# Patient Record
Sex: FEMALE | Race: BLACK OR AFRICAN AMERICAN | Employment: OTHER | URBAN - METROPOLITAN AREA
[De-identification: names, ages, dates, MRNs, and addresses within clinical notes are randomized per-mention and may not be internally consistent; named-entity substitution may affect disease eponyms.]

---

## 2019-10-26 ENCOUNTER — APPOINTMENT (OUTPATIENT)
Dept: GENERAL RADIOLOGY | Age: 69
End: 2019-10-26
Payer: COMMERCIAL

## 2019-10-26 ENCOUNTER — HOSPITAL ENCOUNTER (EMERGENCY)
Age: 69
Discharge: HOME | End: 2019-10-26
Payer: COMMERCIAL

## 2019-10-26 VITALS
SYSTOLIC BLOOD PRESSURE: 126 MMHG | HEART RATE: 68 BPM | OXYGEN SATURATION: 100 % | RESPIRATION RATE: 18 BRPM | TEMPERATURE: 97.7 F | DIASTOLIC BLOOD PRESSURE: 63 MMHG

## 2019-10-26 DIAGNOSIS — M25.512 CHRONIC LEFT SHOULDER PAIN: Primary | ICD-10-CM

## 2019-10-26 DIAGNOSIS — G89.29 CHRONIC LEFT SHOULDER PAIN: Primary | ICD-10-CM

## 2019-10-26 DIAGNOSIS — M19.019 SHOULDER ARTHRITIS: ICD-10-CM

## 2019-10-26 PROCEDURE — 6370000000 HC RX 637 (ALT 250 FOR IP): Performed by: PHYSICIAN ASSISTANT

## 2019-10-26 PROCEDURE — 73030 X-RAY EXAM OF SHOULDER: CPT

## 2019-10-26 PROCEDURE — 99283 EMERGENCY DEPT VISIT LOW MDM: CPT

## 2019-10-26 PROCEDURE — 96372 THER/PROPH/DIAG INJ SC/IM: CPT

## 2019-10-26 PROCEDURE — 6360000002 HC RX W HCPCS: Performed by: PHYSICIAN ASSISTANT

## 2019-10-26 RX ORDER — ORPHENADRINE CITRATE 30 MG/ML
60 INJECTION INTRAMUSCULAR; INTRAVENOUS ONCE
Status: COMPLETED | OUTPATIENT
Start: 2019-10-26 | End: 2019-10-26

## 2019-10-26 RX ORDER — OXYCODONE HYDROCHLORIDE AND ACETAMINOPHEN 5; 325 MG/1; MG/1
1 TABLET ORAL ONCE
Status: COMPLETED | OUTPATIENT
Start: 2019-10-26 | End: 2019-10-26

## 2019-10-26 RX ADMIN — OXYCODONE HYDROCHLORIDE AND ACETAMINOPHEN 1 TABLET: 5; 325 TABLET ORAL at 02:36

## 2019-10-26 RX ADMIN — ORPHENADRINE CITRATE 60 MG: 30 INJECTION INTRAMUSCULAR; INTRAVENOUS at 02:36

## 2019-10-26 ASSESSMENT — PAIN DESCRIPTION - DIRECTION: RADIATING_TOWARDS: HAND

## 2019-10-26 ASSESSMENT — PAIN DESCRIPTION - ORIENTATION
ORIENTATION: LEFT
ORIENTATION: LEFT

## 2019-10-26 ASSESSMENT — PAIN DESCRIPTION - LOCATION
LOCATION: SHOULDER
LOCATION: SHOULDER

## 2019-10-26 ASSESSMENT — PAIN DESCRIPTION - DESCRIPTORS
DESCRIPTORS: SHARP
DESCRIPTORS: SHARP

## 2019-10-26 ASSESSMENT — PAIN DESCRIPTION - PAIN TYPE: TYPE: ACUTE PAIN

## 2019-10-26 ASSESSMENT — PAIN SCALES - GENERAL: PAINLEVEL_OUTOF10: 10

## 2019-10-26 ASSESSMENT — PAIN DESCRIPTION - PROGRESSION: CLINICAL_PROGRESSION: RAPIDLY WORSENING

## 2019-10-26 ASSESSMENT — PAIN DESCRIPTION - FREQUENCY: FREQUENCY: CONTINUOUS

## 2022-06-08 ENCOUNTER — HOSPITAL ENCOUNTER (OUTPATIENT)
Age: 72
Setting detail: OBSERVATION
Discharge: HOME | End: 2022-06-09
Attending: STUDENT IN AN ORGANIZED HEALTH CARE EDUCATION/TRAINING PROGRAM | Admitting: INTERNAL MEDICINE
Payer: MEDICARE

## 2022-06-08 ENCOUNTER — APPOINTMENT (OUTPATIENT)
Dept: GENERAL RADIOLOGY | Age: 72
End: 2022-06-08
Payer: MEDICARE

## 2022-06-08 ENCOUNTER — APPOINTMENT (OUTPATIENT)
Dept: CT IMAGING | Age: 72
End: 2022-06-08
Payer: MEDICARE

## 2022-06-08 DIAGNOSIS — M25.511 ACUTE PAIN OF RIGHT SHOULDER: ICD-10-CM

## 2022-06-08 DIAGNOSIS — R07.9 CHEST PAIN, UNSPECIFIED TYPE: Primary | ICD-10-CM

## 2022-06-08 PROBLEM — I10 PRIMARY HYPERTENSION: Status: ACTIVE | Noted: 2022-06-08

## 2022-06-08 PROBLEM — E11.9 TYPE 2 DIABETES MELLITUS, WITHOUT LONG-TERM CURRENT USE OF INSULIN (HCC): Status: ACTIVE | Noted: 2022-06-08

## 2022-06-08 PROBLEM — E78.5 HLD (HYPERLIPIDEMIA): Status: ACTIVE | Noted: 2022-06-08

## 2022-06-08 LAB
ALBUMIN SERPL-MCNC: 4.3 G/DL (ref 3.5–5.2)
ALP BLD-CCNC: 64 U/L (ref 35–104)
ALT SERPL-CCNC: 19 U/L (ref 0–32)
ANION GAP SERPL CALCULATED.3IONS-SCNC: 11 MMOL/L (ref 7–16)
AST SERPL-CCNC: 25 U/L (ref 0–31)
BACTERIA: NORMAL /HPF
BASOPHILS ABSOLUTE: 0.05 E9/L (ref 0–0.2)
BASOPHILS RELATIVE PERCENT: 1.5 % (ref 0–2)
BILIRUB SERPL-MCNC: 0.6 MG/DL (ref 0–1.2)
BILIRUBIN URINE: NEGATIVE
BLOOD, URINE: NEGATIVE
BUN BLDV-MCNC: 12 MG/DL (ref 6–23)
CALCIUM SERPL-MCNC: 9 MG/DL (ref 8.6–10.2)
CHLORIDE BLD-SCNC: 98 MMOL/L (ref 98–107)
CHOLESTEROL, TOTAL: 158 MG/DL (ref 0–199)
CLARITY: CLEAR
CO2: 28 MMOL/L (ref 22–29)
COLOR: YELLOW
CREAT SERPL-MCNC: 0.8 MG/DL (ref 0.5–1)
D DIMER: <200 NG/ML DDU
EKG ATRIAL RATE: 57 BPM
EKG P AXIS: 53 DEGREES
EKG P-R INTERVAL: 168 MS
EKG Q-T INTERVAL: 424 MS
EKG QRS DURATION: 88 MS
EKG QTC CALCULATION (BAZETT): 412 MS
EKG R AXIS: 45 DEGREES
EKG T AXIS: 57 DEGREES
EKG VENTRICULAR RATE: 57 BPM
EOSINOPHILS ABSOLUTE: 0.08 E9/L (ref 0.05–0.5)
EOSINOPHILS RELATIVE PERCENT: 2.4 % (ref 0–6)
GFR AFRICAN AMERICAN: >60
GFR NON-AFRICAN AMERICAN: >60 ML/MIN/1.73
GLUCOSE BLD-MCNC: 168 MG/DL (ref 74–99)
GLUCOSE URINE: NEGATIVE MG/DL
HBA1C MFR BLD: 8 % (ref 4–5.6)
HCT VFR BLD CALC: 36.3 % (ref 34–48)
HDLC SERPL-MCNC: 60 MG/DL
HEMOGLOBIN: 11.7 G/DL (ref 11.5–15.5)
IMMATURE GRANULOCYTES #: 0.01 E9/L
IMMATURE GRANULOCYTES %: 0.3 % (ref 0–5)
KETONES, URINE: NEGATIVE MG/DL
LDL CHOLESTEROL CALCULATED: 86 MG/DL (ref 0–99)
LEUKOCYTE ESTERASE, URINE: NEGATIVE
LYMPHOCYTES ABSOLUTE: 1.27 E9/L (ref 1.5–4)
LYMPHOCYTES RELATIVE PERCENT: 37.4 % (ref 20–42)
MCH RBC QN AUTO: 27.2 PG (ref 26–35)
MCHC RBC AUTO-ENTMCNC: 32.2 % (ref 32–34.5)
MCV RBC AUTO: 84.4 FL (ref 80–99.9)
METER GLUCOSE: 158 MG/DL (ref 74–99)
METER GLUCOSE: 388 MG/DL (ref 74–99)
MONOCYTES ABSOLUTE: 0.42 E9/L (ref 0.1–0.95)
MONOCYTES RELATIVE PERCENT: 12.4 % (ref 2–12)
NEUTROPHILS ABSOLUTE: 1.57 E9/L (ref 1.8–7.3)
NEUTROPHILS RELATIVE PERCENT: 46 % (ref 43–80)
NITRITE, URINE: NEGATIVE
PDW BLD-RTO: 11.9 FL (ref 11.5–15)
PH UA: 6 (ref 5–9)
PLATELET # BLD: 223 E9/L (ref 130–450)
PMV BLD AUTO: 11 FL (ref 7–12)
POTASSIUM REFLEX MAGNESIUM: 3.6 MMOL/L (ref 3.5–5)
PRO-BNP: 96 PG/ML (ref 0–125)
PROTEIN UA: NEGATIVE MG/DL
RBC # BLD: 4.3 E12/L (ref 3.5–5.5)
RBC UA: NORMAL /HPF (ref 0–2)
SODIUM BLD-SCNC: 137 MMOL/L (ref 132–146)
SPECIFIC GRAVITY UA: <=1.005 (ref 1–1.03)
TOTAL PROTEIN: 6.8 G/DL (ref 6.4–8.3)
TRIGL SERPL-MCNC: 58 MG/DL (ref 0–149)
TROPONIN, HIGH SENSITIVITY: <6 NG/L (ref 0–9)
TROPONIN, HIGH SENSITIVITY: <6 NG/L (ref 0–9)
UROBILINOGEN, URINE: 0.2 E.U./DL
VLDLC SERPL CALC-MCNC: 12 MG/DL
WBC # BLD: 3.4 E9/L (ref 4.5–11.5)
WBC UA: NORMAL /HPF (ref 0–5)

## 2022-06-08 PROCEDURE — 6360000004 HC RX CONTRAST MEDICATION: Performed by: RADIOLOGY

## 2022-06-08 PROCEDURE — 83880 ASSAY OF NATRIURETIC PEPTIDE: CPT

## 2022-06-08 PROCEDURE — 6360000002 HC RX W HCPCS: Performed by: INTERNAL MEDICINE

## 2022-06-08 PROCEDURE — 71045 X-RAY EXAM CHEST 1 VIEW: CPT

## 2022-06-08 PROCEDURE — 85378 FIBRIN DEGRADE SEMIQUANT: CPT

## 2022-06-08 PROCEDURE — 96372 THER/PROPH/DIAG INJ SC/IM: CPT

## 2022-06-08 PROCEDURE — 6370000000 HC RX 637 (ALT 250 FOR IP): Performed by: STUDENT IN AN ORGANIZED HEALTH CARE EDUCATION/TRAINING PROGRAM

## 2022-06-08 PROCEDURE — 36415 COLL VENOUS BLD VENIPUNCTURE: CPT

## 2022-06-08 PROCEDURE — 2580000003 HC RX 258: Performed by: RADIOLOGY

## 2022-06-08 PROCEDURE — 80053 COMPREHEN METABOLIC PANEL: CPT

## 2022-06-08 PROCEDURE — 93005 ELECTROCARDIOGRAM TRACING: CPT | Performed by: STUDENT IN AN ORGANIZED HEALTH CARE EDUCATION/TRAINING PROGRAM

## 2022-06-08 PROCEDURE — 81001 URINALYSIS AUTO W/SCOPE: CPT

## 2022-06-08 PROCEDURE — 82962 GLUCOSE BLOOD TEST: CPT

## 2022-06-08 PROCEDURE — 80061 LIPID PANEL: CPT

## 2022-06-08 PROCEDURE — 2580000003 HC RX 258: Performed by: INTERNAL MEDICINE

## 2022-06-08 PROCEDURE — 6370000000 HC RX 637 (ALT 250 FOR IP): Performed by: INTERNAL MEDICINE

## 2022-06-08 PROCEDURE — 99220 PR INITIAL OBSERVATION CARE/DAY 70 MINUTES: CPT | Performed by: INTERNAL MEDICINE

## 2022-06-08 PROCEDURE — 6360000002 HC RX W HCPCS: Performed by: STUDENT IN AN ORGANIZED HEALTH CARE EDUCATION/TRAINING PROGRAM

## 2022-06-08 PROCEDURE — 6370000000 HC RX 637 (ALT 250 FOR IP): Performed by: NURSE PRACTITIONER

## 2022-06-08 PROCEDURE — 85025 COMPLETE CBC W/AUTO DIFF WBC: CPT

## 2022-06-08 PROCEDURE — 99285 EMERGENCY DEPT VISIT HI MDM: CPT

## 2022-06-08 PROCEDURE — 71275 CT ANGIOGRAPHY CHEST: CPT

## 2022-06-08 PROCEDURE — G0378 HOSPITAL OBSERVATION PER HR: HCPCS

## 2022-06-08 PROCEDURE — 84484 ASSAY OF TROPONIN QUANT: CPT

## 2022-06-08 PROCEDURE — 96374 THER/PROPH/DIAG INJ IV PUSH: CPT

## 2022-06-08 PROCEDURE — 83036 HEMOGLOBIN GLYCOSYLATED A1C: CPT

## 2022-06-08 PROCEDURE — APPSS45 APP SPLIT SHARED TIME 31-45 MINUTES: Performed by: NURSE PRACTITIONER

## 2022-06-08 RX ORDER — ACETAMINOPHEN 325 MG/1
650 TABLET ORAL EVERY 6 HOURS PRN
Status: DISCONTINUED | OUTPATIENT
Start: 2022-06-08 | End: 2022-06-09 | Stop reason: HOSPADM

## 2022-06-08 RX ORDER — INSULIN LISPRO 100 [IU]/ML
0-12 INJECTION, SOLUTION INTRAVENOUS; SUBCUTANEOUS
Status: DISCONTINUED | OUTPATIENT
Start: 2022-06-09 | End: 2022-06-09 | Stop reason: HOSPADM

## 2022-06-08 RX ORDER — GLIPIZIDE 5 MG/1
10 TABLET ORAL
Status: DISCONTINUED | OUTPATIENT
Start: 2022-06-09 | End: 2022-06-09 | Stop reason: HOSPADM

## 2022-06-08 RX ORDER — DEXTROSE MONOHYDRATE 50 MG/ML
100 INJECTION, SOLUTION INTRAVENOUS PRN
Status: DISCONTINUED | OUTPATIENT
Start: 2022-06-08 | End: 2022-06-09 | Stop reason: HOSPADM

## 2022-06-08 RX ORDER — SODIUM CHLORIDE 0.9 % (FLUSH) 0.9 %
10 SYRINGE (ML) INJECTION PRN
Status: DISCONTINUED | OUTPATIENT
Start: 2022-06-08 | End: 2022-06-08

## 2022-06-08 RX ORDER — ONDANSETRON 4 MG/1
4 TABLET, ORALLY DISINTEGRATING ORAL EVERY 8 HOURS PRN
Status: DISCONTINUED | OUTPATIENT
Start: 2022-06-08 | End: 2022-06-09 | Stop reason: HOSPADM

## 2022-06-08 RX ORDER — ENOXAPARIN SODIUM 100 MG/ML
40 INJECTION SUBCUTANEOUS DAILY
Status: DISCONTINUED | OUTPATIENT
Start: 2022-06-08 | End: 2022-06-09 | Stop reason: HOSPADM

## 2022-06-08 RX ORDER — SODIUM CHLORIDE 9 MG/ML
INJECTION, SOLUTION INTRAVENOUS PRN
Status: DISCONTINUED | OUTPATIENT
Start: 2022-06-08 | End: 2022-06-09 | Stop reason: HOSPADM

## 2022-06-08 RX ORDER — DIPHENHYDRAMINE HCL 25 MG
50 TABLET ORAL ONCE
Status: COMPLETED | OUTPATIENT
Start: 2022-06-08 | End: 2022-06-08

## 2022-06-08 RX ORDER — INSULIN LISPRO 100 [IU]/ML
0-6 INJECTION, SOLUTION INTRAVENOUS; SUBCUTANEOUS NIGHTLY
Status: DISCONTINUED | OUTPATIENT
Start: 2022-06-08 | End: 2022-06-09 | Stop reason: HOSPADM

## 2022-06-08 RX ORDER — INSULIN LISPRO 100 [IU]/ML
0-3 INJECTION, SOLUTION INTRAVENOUS; SUBCUTANEOUS NIGHTLY
Status: DISCONTINUED | OUTPATIENT
Start: 2022-06-08 | End: 2022-06-08

## 2022-06-08 RX ORDER — ONDANSETRON 2 MG/ML
4 INJECTION INTRAMUSCULAR; INTRAVENOUS EVERY 6 HOURS PRN
Status: DISCONTINUED | OUTPATIENT
Start: 2022-06-08 | End: 2022-06-09 | Stop reason: HOSPADM

## 2022-06-08 RX ORDER — HYDROCHLOROTHIAZIDE 12.5 MG/1
6.25 TABLET ORAL DAILY
Status: DISCONTINUED | OUTPATIENT
Start: 2022-06-08 | End: 2022-06-09 | Stop reason: HOSPADM

## 2022-06-08 RX ORDER — METHYLPREDNISOLONE SODIUM SUCCINATE 125 MG/2ML
125 INJECTION, POWDER, LYOPHILIZED, FOR SOLUTION INTRAMUSCULAR; INTRAVENOUS DAILY
Status: DISCONTINUED | OUTPATIENT
Start: 2022-06-08 | End: 2022-06-08

## 2022-06-08 RX ORDER — SODIUM CHLORIDE 0.9 % (FLUSH) 0.9 %
5-40 SYRINGE (ML) INJECTION EVERY 12 HOURS SCHEDULED
Status: DISCONTINUED | OUTPATIENT
Start: 2022-06-08 | End: 2022-06-09 | Stop reason: HOSPADM

## 2022-06-08 RX ORDER — SODIUM CHLORIDE 0.9 % (FLUSH) 0.9 %
5-40 SYRINGE (ML) INJECTION PRN
Status: DISCONTINUED | OUTPATIENT
Start: 2022-06-08 | End: 2022-06-09 | Stop reason: HOSPADM

## 2022-06-08 RX ORDER — ACETAMINOPHEN 650 MG/1
650 SUPPOSITORY RECTAL EVERY 6 HOURS PRN
Status: DISCONTINUED | OUTPATIENT
Start: 2022-06-08 | End: 2022-06-09 | Stop reason: HOSPADM

## 2022-06-08 RX ORDER — INSULIN LISPRO 100 [IU]/ML
0-6 INJECTION, SOLUTION INTRAVENOUS; SUBCUTANEOUS
Status: DISCONTINUED | OUTPATIENT
Start: 2022-06-08 | End: 2022-06-08

## 2022-06-08 RX ORDER — BISOPROLOL FUMARATE AND HYDROCHLOROTHIAZIDE 2.5; 6.25 MG/1; MG/1
1 TABLET ORAL DAILY
Status: DISCONTINUED | OUTPATIENT
Start: 2022-06-08 | End: 2022-06-08 | Stop reason: RX

## 2022-06-08 RX ORDER — ROSUVASTATIN CALCIUM 10 MG/1
10 TABLET, COATED ORAL DAILY
Status: DISCONTINUED | OUTPATIENT
Start: 2022-06-08 | End: 2022-06-09 | Stop reason: HOSPADM

## 2022-06-08 RX ORDER — METOPROLOL SUCCINATE 25 MG/1
25 TABLET, EXTENDED RELEASE ORAL DAILY
Status: DISCONTINUED | OUTPATIENT
Start: 2022-06-08 | End: 2022-06-09 | Stop reason: HOSPADM

## 2022-06-08 RX ORDER — POLYETHYLENE GLYCOL 3350 17 G/17G
17 POWDER, FOR SOLUTION ORAL DAILY PRN
Status: DISCONTINUED | OUTPATIENT
Start: 2022-06-08 | End: 2022-06-09 | Stop reason: HOSPADM

## 2022-06-08 RX ADMIN — INSULIN LISPRO 5 UNITS: 100 INJECTION, SOLUTION INTRAVENOUS; SUBCUTANEOUS at 21:32

## 2022-06-08 RX ADMIN — METOPROLOL SUCCINATE 25 MG: 25 TABLET, EXTENDED RELEASE ORAL at 17:43

## 2022-06-08 RX ADMIN — HYDROCHLOROTHIAZIDE 6.25 MG: 12.5 TABLET ORAL at 17:44

## 2022-06-08 RX ADMIN — ROSUVASTATIN CALCIUM 10 MG: 10 TABLET, FILM COATED ORAL at 17:43

## 2022-06-08 RX ADMIN — ASPIRIN 325 MG: 325 TABLET, COATED ORAL at 14:09

## 2022-06-08 RX ADMIN — SODIUM CHLORIDE, PRESERVATIVE FREE 10 ML: 5 INJECTION INTRAVENOUS at 21:29

## 2022-06-08 RX ADMIN — INSULIN LISPRO 1 UNITS: 100 INJECTION, SOLUTION INTRAVENOUS; SUBCUTANEOUS at 17:47

## 2022-06-08 RX ADMIN — ENOXAPARIN SODIUM 40 MG: 40 INJECTION SUBCUTANEOUS at 17:44

## 2022-06-08 RX ADMIN — METHYLPREDNISOLONE SODIUM SUCCINATE 125 MG: 125 INJECTION, POWDER, FOR SOLUTION INTRAMUSCULAR; INTRAVENOUS at 14:11

## 2022-06-08 RX ADMIN — IOPAMIDOL 75 ML: 755 INJECTION, SOLUTION INTRAVENOUS at 14:47

## 2022-06-08 RX ADMIN — DIPHENHYDRAMINE HCL 50 MG: 25 TABLET ORAL at 14:09

## 2022-06-08 RX ADMIN — SODIUM CHLORIDE, PRESERVATIVE FREE 10 ML: 5 INJECTION INTRAVENOUS at 14:49

## 2022-06-08 ASSESSMENT — PAIN SCALES - GENERAL
PAINLEVEL_OUTOF10: 2
PAINLEVEL_OUTOF10: 0

## 2022-06-08 ASSESSMENT — ENCOUNTER SYMPTOMS
ABDOMINAL PAIN: 0
BACK PAIN: 1
CHEST TIGHTNESS: 0
DIARRHEA: 0
COUGH: 0
NAUSEA: 0
SORE THROAT: 0
VOMITING: 0
SHORTNESS OF BREATH: 0
ABDOMINAL DISTENTION: 0

## 2022-06-08 ASSESSMENT — PAIN DESCRIPTION - ORIENTATION: ORIENTATION: RIGHT

## 2022-06-08 ASSESSMENT — PAIN DESCRIPTION - DESCRIPTORS: DESCRIPTORS: OTHER (COMMENT);TENDER

## 2022-06-08 ASSESSMENT — PAIN DESCRIPTION - LOCATION: LOCATION: SHOULDER

## 2022-06-08 NOTE — ED NOTES
The following labs labeled with pt sticker and tubed to lab:     [x] Blue     [] Lavender   [] on ice  [x] Green/yellow  [] Green/black [] on ice  [] Yellow  [] Red  [] Pink      [] COVID-19 swab    [] Rapid  [] PCR  [] Flu swab  [] Peds Viral Panel     [] Urine Sample  [] Pelvic Cultures  [] Blood Cultures          Johanne Chiu RN  06/08/22 3469

## 2022-06-08 NOTE — PROGRESS NOTES
Admission database completed to best of this RN's ability. Care plan and education initiated. Pt from home with . Denies any DME or [facility] services prior to admission.

## 2022-06-08 NOTE — H&P
[facility]   History and Physical      CHIEF COMPLAINT:   Chest pain    History of Present Illness:  80 y.o. female with a past medical  history of DM2, HTN, HLD who presents to the ER with sudden onset right sided chest pain that radiates to her right shoulder. She describes the pain as a squeezing, tight pain that is worse with movement and is reproducible. Denies aggravating or alleviating factors. No associated symptoms. Informant(s) for H&P: patient     REVIEW OF SYSTEMS:  Denies SOB subjective fever/chills, cough, congestion, n/v/d, ha, vision/hearing changes, wt changes, hot/cold flashes, other open skin lesions, constipation, dysuria/hematuria unless noted in HPI. Complete ROS performed with the patient and is otherwise negative. PMH:  Past Medical History:   Diagnosis Date    Diabetes mellitus ([facility] [address]     Hyperlipidemia     Hypertension        Surgical History:  Past Surgical History:   Procedure Laterality Date    HYSTERECTOMY         Medications Prior to Admission:    Prior to Admission medications    Medication Sig Start Date End Date Taking? Authorizing Provider   glipiZIDE (GLUCOTROL XL) 10 MG extended release tablet Take 10 mg by mouth daily    Historical Provider, MD   loratadine (CLARITIN) 10 MG tablet Take 1 tablet by mouth daily 8/22/17   Tyree Dakin Swift,    bisoprolol-hydrochlorothiazide [facility]) 2.5-6.25 MG per tablet Take 1 tablet by mouth daily. Historical Provider, MD   potassium chloride (MICRO-K) 10 MEQ CR capsule Take 10 mEq by mouth 2 times daily. Historical Provider, MD   meclizine (ANTIVERT) 25 MG CHEW Take 25 mg by mouth 3 times daily as needed. Historical Provider, MD   cyclobenzaprine (FLEXERIL) 10 MG tablet Take 10 mg by mouth 3 times daily as needed for Muscle spasms. Historical Provider, MD   rosuvastatin (CRESTOR) 10 MG tablet Take 10 mg by mouth daily.     Historical Provider, MD   metFORMIN (GLUCOPHAGE) 500 MG tablet Take 1,000 mg by mouth 2 times daily (with meals)     Historical Provider, MD       Allergies: Iv dye [iodides]    Social History:    reports that she has never smoked. She has never used smokeless tobacco. She reports that she does not drink alcohol and does not use drugs. Family History:   family history is not on file. PHYSICAL EXAM:  Vitals:  BP (!) 163/78   Pulse 60   Temp (!) 96.5 °F (35.8 °C) (Temporal)   Resp 16   Ht 5' 6\" (1.676 m)   Wt 152 lb (68.9 kg)   SpO2 99%   BMI 24.53 kg/m²     General Appearance: alert and oriented to person, place and time and in no acute distress  Skin: warm and dry  Head: normocephalic and atraumatic  Eyes: pupils equal, round, and reactive to light, extraocular eye movements intact, conjunctivae normal  Neck: neck supple and non tender without mass   Pulmonary/Chest: clear to auscultation bilaterally- no wheezes, rales or rhonchi, normal air movement, no respiratory distress  Cardiovascular: normal rate, normal S1 and S2 and no carotid bruits  Abdomen: soft, non-tender, non-distended, normal bowel sounds, no masses or organomegaly  Extremities: no cyanosis, no clubbing and no edema  Neurologic: no cranial nerve deficit and speech normal    LABS:  Recent Labs     06/08/22  1324      K 3.6   CL 98   CO2 28   BUN 12   CREATININE 0.8   GLUCOSE 168*   CALCIUM 9.0       Recent Labs     06/08/22  1324   WBC 3.4*   RBC 4.30   HGB 11.7   HCT 36.3   MCV 84.4   MCH 27.2   MCHC 32.2   RDW 11.9      MPV 11.0       No results for input(s): POCGLU in the last 72 hours. I reviewed all labs and diagnostic images        Radiology: XR CHEST PORTABLE    Result Date: 6/8/2022  EXAMINATION: ONE XRAY VIEW OF THE CHEST 6/8/2022 1:42 pm COMPARISON: None. HISTORY: ORDERING SYSTEM PROVIDED HISTORY: chest pain TECHNOLOGIST PROVIDED HISTORY: Reason for exam:->chest pain FINDINGS: Single AP erect portable chest demonstrate satisfactory expansion lungs which are clear. There are atherosclerotic changes of the aorta without cardiomegaly. There is no evidence of a pneumothorax. No acute cardiopulmonary process. See the CT chest follow-up. CTA CHEST W CONTRAST    Result Date: 6/8/2022  EXAMINATION: CTA OF THE CHEST 6/8/2022 2:46 pm TECHNIQUE: CTA of the chest was performed after the administration of intravenous contrast.  Multiplanar reformatted images are provided for review. MIP images are provided for review. Automated exposure control, iterative reconstruction, and/or weight based adjustment of the mA/kV was utilized to reduce the radiation dose to as low as reasonably achievable. COMPARISON: None. HISTORY: ORDERING SYSTEM PROVIDED HISTORY: chest pain into the back TECHNOLOGIST PROVIDED HISTORY: Reason for exam:->chest pain into the back Decision Support Exception - unselect if not a suspected or confirmed emergency medical condition->Emergency Medical Condition (MA) FINDINGS: Pulmonary Arteries: Pulmonary arteries are adequately opacified for evaluation. No evidence of intraluminal filling defect to suggest pulmonary embolism. Main pulmonary artery is normal in caliber. Mediastinum: No evidence of mediastinal lymphadenopathy. The heart and pericardium demonstrate no acute abnormality. There is no acute abnormality of the thoracic aorta. Lungs/pleura: The lungs are without acute process. No focal consolidation or pulmonary edema. No evidence of pleural effusion or pneumothorax. Upper Abdomen: Limited images of the upper abdomen reveals stones identified in the gallbladder. Soft Tissues/Bones: No acute bone or soft tissue abnormality. No evidence of pulmonary embolism or acute pulmonary abnormality. EKG:       ASSESSMENT:      Principal Problem:    Chest pain  Active Problems:    Type 2 diabetes mellitus, without long-term current use of insulin (HCC)    Primary hypertension    HLD (hyperlipidemia)  Resolved Problems:    * No resolved hospital problems. *      PLAN:    1. Chest pain  -normal troponin  -HEART score 4  -given  mg in ER  -NPO after midnight for stress in am  -add ASA  -for Echo    2. DM2:  -continue home glipizide, hold  Metformin 48hrs due  To CT with ontrast  -add carb control diet, low dose insulin slide sale, hypoglycemia protocol  - update A1c    3. HTN:  -resume home bisopropol-HCTZ    4. HLD:  -resume home statin         Code Status:  full  DVT prophylaxis: lovenox         Electronically signed by CHRIS De La Torre - CNP on 6/8/2022 at 3:59 PM    Addendum: I have personally participated in the history, exam, medical decision making with Annika Brady on the date of service and I agree with all of the pertinent clinical information unless otherwise noted. I have also reviewed and agree with the past medical, family, and social history unless otherwise noted. Patient was admitted with chest pain. PHYSICAL EXAM:  Vitals:  BP (!) 167/72   Pulse 64   Temp 98.3 °F (36.8 °C) (Oral)   Resp 16   Ht 5' 6\" (1.676 m)   Wt 152 lb (68.9 kg)   SpO2 97%   BMI 24.53 kg/m²   Gen: awake, alert, NAD  Lungs: clear to auscultation bilaterally no crackles no wheezing. Heart: RRR, no murmur   Abdomen: soft nontender nondistended positive bowel sounds. Extremities: full range of motion no peripheral edema. Impression:  Principal Problem:    Chest pain  Active Problems:    Type 2 diabetes mellitus, without long-term current use of insulin (HCC)    Primary hypertension    HLD (hyperlipidemia)  Resolved Problems:    * No resolved hospital problems. *      My findings/plan include:      10year-old female presents with history of diabetes mellitus type 2, hypertension tension, hyperlipidemia who presents with right-sided chest pain that began yesterday. Patient states that she has not been doing any exertion particular when it first started. Patient states that the chest pain on the right side goes to the back.   She denies any nausea, vomiting, shortness of breath, diaphoresis associated with it. Patient states that it is intermittent. At its worst is 8 out of 10. Patient states that she has not taken any medications to improve in particular no nitro. Patient states she does not have a cardiologist.  She does not remember the last time she had a cardiac evaluation. Troponin high-sensitivity's x2 negative. EKG unremarkable for any acute ischemic changes. We will check hemoglobin A1c as well as LDL. Follow-up with stress test tomorrow. At this point ACS has been ruled out. However we do need to minimize risk and risk stratify her. NOTE: This report was transcribed using voice recognition software. Every effort was made to ensure accuracy; however, inadvertent computerized transcription errors may be present.   Electronically signed by Masha Radford DO on 6/8/2022 at 4:55 PM

## 2022-06-08 NOTE — PLAN OF CARE
Problem: Pain  Goal: Verbalizes/displays adequate comfort level or baseline comfort level  6/8/2022 1856 by Velma Mcclelland RN  Outcome: Progressing  6/8/2022 1855 by Velma Mcclelland RN  Outcome: Progressing     Problem: Safety - Adult  Goal: Free from fall injury  Outcome: Progressing

## 2022-06-08 NOTE — ED NOTES
Radiology Procedure Waiver   Name: Stephanie Chen  : 1950  MRN: [id number]    Date:  22    Time: 1:22 PM EDT    Benefits of immediately proceeding with Radiology exam(s) without pre-testing outweigh the risks or are not indicated as specified below and therefore the following is/are being waived:    [] Pregnancy test   [] Patients LMP on-time and regular.   [] Patient had Tubal Ligation or has other Contraception Device. [] Patient  is Menopausal or Premenarcheal.    [] Patient had Full or Partial Hysterectomy. [x] Protocol for Iodine allergy    [] MRI Questionnaire     [] BUN/Creatinine   [] Patient age w/no hx of renal dysfunction. [] Patient on Dialysis. [] Recent Normal Labs.   Electronically signed by Tru Crawford MD on 22 at 1:22 PM EDT               Tru Crawford MD  Resident  22 1419

## 2022-06-08 NOTE — ED PROVIDER NOTES
HPI     Patient is a 80 y.o. female presents with a chief complaint of chest pain  This has been occurring for one day. Patient states that it gets better with rest.  Patient states that it gets worse with nothing. Patient states that it is moderate in severity. Patient states it was gradual in onset. Patient presents with chest pain. Patient states it goes through into the right shoulder. Patient states it is worse with activity. Patient denies any changes in urinary or bowel habits. Patient states that she has a history of high blood pressure, high cholesterol, diabetes. Patient states she has never had pain like this before. Patient denies any changes in urinary or bowel habits. Patient has pain into the back right shoulder. Review of Systems   Constitutional: Negative for activity change, appetite change, chills, fatigue and fever. HENT: Negative for congestion, drooling and sore throat. Respiratory: Negative for cough, chest tightness and shortness of breath. Cardiovascular: Positive for chest pain. Negative for palpitations. Gastrointestinal: Negative for abdominal distention, abdominal pain, diarrhea, nausea and vomiting. Genitourinary: Negative for decreased urine volume, difficulty urinating, enuresis, flank pain, frequency and hematuria. Musculoskeletal: Positive for back pain. Negative for arthralgias and neck stiffness. Skin: Negative for rash and wound. Neurological: Negative for dizziness, facial asymmetry, light-headedness and headaches. Psychiatric/Behavioral: Negative for agitation, confusion and decreased concentration. Physical Exam  Vitals and nursing note reviewed. Constitutional:       Appearance: She is well-developed. HENT:      Head: Normocephalic and atraumatic. Eyes:      Conjunctiva/sclera: Conjunctivae normal.   Cardiovascular:      Rate and Rhythm: Normal rate and regular rhythm. Heart sounds: Normal heart sounds.  No murmur heard.      Pulmonary:      Effort: Pulmonary effort is normal. No respiratory distress. Breath sounds: Normal breath sounds. No wheezing or rales. Abdominal:      General: Bowel sounds are normal.      Palpations: Abdomen is soft. Tenderness: There is no abdominal tenderness. There is no guarding or rebound. Musculoskeletal:      Cervical back: Normal range of motion and neck supple. Comments: Reproducible back pain over the right shoulder. Skin:     General: Skin is warm and dry. Neurological:      Mental Status: She is alert and oriented to person, place, and time. Cranial Nerves: No cranial nerve deficit. Coordination: Coordination normal.          Procedures     MDM   The HEART Risk Score for Acute Coronary Syndrome  Age <46 years, 55-63, 65+  ?  2   > 2 Risk Factors for CAD?*, 1-2, 0  2   History: slight, moderate, highly suspicious  1   EKG: Normal, nonspecific repolarization changes, ST depression   1       Total HEART Score: 6     *Risk factors as follows:                  - Family history of CAD    - Hypertension      - Hyperlipidemia     - Diabetes mellitus  - Current smoker  - Obesity    Predicts 6-week risk of major adverse cardiac event. Low Score (0-3 points), risk of MACE of 0.9-1.7%. Moderate Score (4-6 points), risk of MACE of 12-16.6%. High Score (7-10 points), risk of MACE of 50-65%. ED Course as of 06/08/22 1955 Wed Jun 08, 2022   1529 EKG read interpreted by me. Rate 57 bpm.  Normal axis. Normal [place] interval.  Normal QRS. Flattening of the T waves in the anterior leads. No ST elevations or depressions. No prior EKG to compare to. [JM]      ED Course User Index  [JM] Frederic Ballesteros MD      Patient is a 80 y.o. female presenting with chest pain and back pain. Patient's initial blood pressure was 303 systolic. Patient has a previous history did die but states that it had no respiratory symptoms. Patient was pretreated.   Patient was then given a CTA to rule out aortic dissection. Patient CTA was negative. Patient had an EKG that was benign. Patient's initial troponin was benign. Given patient's elevated heart score and no history of stress test patient will be admitted to internal medicine for cardiac observation. Discussed this with patient is agreeable to this plan. Discussed this case with internal medicine is agreeable to this plan. Patient was seen and evaluated by myself and my attending Adilson Fajardo DO. Assessment and Plan discussed with attending provider, please see attestation for final plan of care. This note was done using dictation software and there may be some grammatical errors associated with this. Torie Rodrigues MD       ED Course as of 06/08/22 2004 Wed Jun 08, 2022   2754 EKG read interpreted by me. Rate 57 bpm.  Normal axis. Normal [place] interval.  Normal QRS. Flattening of the T waves in the anterior leads. No ST elevations or depressions. No prior EKG to compare to. [JM]      ED Course User Index  [] Torie Rodrigues MD       --------------------------------------------- PAST HISTORY ---------------------------------------------  Past Medical History:  has a past medical history of Diabetes mellitus ([address] Hyperlipidemia, and Hypertension. Past Surgical History:  has a past surgical history that includes Hysterectomy. Social History:  reports that she has never smoked. She has never used smokeless tobacco. She reports that she does not drink alcohol and does not use drugs. Family History: family history is not on file. The patients home medications have been reviewed. Allergies:  Iv dye [iodides]    -------------------------------------------------- RESULTS -------------------------------------------------    LABS:  Results for orders placed or performed during the hospital encounter of 06/08/22   CBC with Auto Differential   Result Value Ref Range    WBC 3.4 (L) 4.5 - 11.5 E9/L    RBC Nitrite, Urine Negative Negative    Leukocyte Esterase, Urine Negative Negative    WBC, UA NONE 0 - 5 /HPF    RBC, UA NONE 0 - 2 /HPF    Bacteria, UA NONE SEEN None Seen /HPF   D-Dimer, Quantitative   Result Value Ref Range    D-Dimer, Quant <200 ng/mL DDU   Troponin   Result Value Ref Range    Troponin, High Sensitivity <6 0 - 9 ng/L   Lipid Panel   Result Value Ref Range    Cholesterol, Total 158 0 - 199 mg/dL    Triglycerides 58 0 - 149 mg/dL    HDL 60 >40 mg/dL    LDL Calculated 86 0 - 99 mg/dL    VLDL Cholesterol Calculated 12 mg/dL   POCT Glucose   Result Value Ref Range    Meter Glucose 158 (H) 74 - 99 mg/dL   EKG 12 Lead   Result Value Ref Range    Ventricular Rate 57 BPM    Atrial Rate 57 BPM    P-R Interval 168 ms    QRS Duration 88 ms    Q-T Interval 424 ms    QTc Calculation (Bazett) 412 ms    P Axis 53 degrees    R Axis 45 degrees    T Axis 57 degrees       RADIOLOGY:  CTA CHEST W CONTRAST   Final Result   No evidence of pulmonary embolism or acute pulmonary abnormality. XR CHEST PORTABLE   Final Result   No acute cardiopulmonary process. See the CT chest follow-up. NM Cardiac Stress Test Nuclear Imaging    (Results Pending)           ------------------------- NURSING NOTES AND VITALS REVIEWED ---------------------------  Date / Time Roomed:  6/8/2022 12:47 PM  ED Bed Assignment:  [id number]    The nursing notes within the ED encounter and vital signs as below have been reviewed.      Patient Vitals for the past 24 hrs:   BP Temp Temp src Pulse Resp SpO2 Height Weight   06/08/22 1645 (!) 167/72 98.3 °F (36.8 °C) Oral 64 16 97 %     06/08/22 1545 (!) 170/70 97.8 °F (36.6 °C) Oral 60 16 98 %     06/08/22 1400 (!) 163/78   60 16 99 %     06/08/22 1246 (!) 194/83 (!) 96.5 °F (35.8 °C) Temporal 69 14 99 % 5' 6\" (1.676 m) 152 lb (68.9 kg)       Oxygen Saturation Interpretation: Normal    ------------------------------------------ PROGRESS NOTES ------------------------------------------  See ED course for reevaluation    Counseling:  I have spoken with the patient and discussed todays results, in addition to providing specific details for the plan of care and counseling regarding the diagnosis and prognosis. Their questions are answered at this time and they are agreeable with the plan of admission.    --------------------------------- ADDITIONAL PROVIDER NOTES ---------------------------------  Consultations:   Spoke with Dr. Aspen Brown. Discussed case. They will admit the patient. This patient's ED course included: a personal history and physicial examination, re-evaluation prior to disposition, multiple bedside re-evaluations, IV medications, cardiac monitoring and continuous pulse oximetry    This patient has remained hemodynamically stable during their ED course. Diagnosis:  1. Chest pain, unspecified type    2.  Acute pain of right shoulder        Disposition:  Patient's disposition: Admit to telemetry  Patient's condition is Stable         Hanane Tyler MD  Resident  06/08/22 2004

## 2022-06-08 NOTE — Clinical Note
Discharge Plan[de-identified] Other/[facility])   Telemetry/Cardiac Monitoring Required?: Yes

## 2022-06-09 ENCOUNTER — APPOINTMENT (OUTPATIENT)
Dept: NON INVASIVE DIAGNOSTICS | Age: 72
End: 2022-06-09
Payer: MEDICARE

## 2022-06-09 ENCOUNTER — APPOINTMENT (OUTPATIENT)
Dept: NUCLEAR MEDICINE | Age: 72
End: 2022-06-09
Payer: MEDICARE

## 2022-06-09 VITALS
DIASTOLIC BLOOD PRESSURE: 74 MMHG | BODY MASS INDEX: 24.43 KG/M2 | HEART RATE: 62 BPM | RESPIRATION RATE: 16 BRPM | SYSTOLIC BLOOD PRESSURE: 128 MMHG | OXYGEN SATURATION: 96 % | WEIGHT: 152 LBS | HEIGHT: 66 IN | TEMPERATURE: 97.7 F

## 2022-06-09 PROBLEM — R07.89 ATYPICAL CHEST PAIN: Status: ACTIVE | Noted: 2022-06-08

## 2022-06-09 LAB
LV EF: 65 %
LV EF: 75 %
LVEF MODALITY: NORMAL
LVEF MODALITY: NORMAL
METER GLUCOSE: 169 MG/DL (ref 74–99)
METER GLUCOSE: 176 MG/DL (ref 74–99)

## 2022-06-09 PROCEDURE — 6370000000 HC RX 637 (ALT 250 FOR IP): Performed by: NURSE PRACTITIONER

## 2022-06-09 PROCEDURE — 6360000002 HC RX W HCPCS: Performed by: INTERNAL MEDICINE

## 2022-06-09 PROCEDURE — 82962 GLUCOSE BLOOD TEST: CPT

## 2022-06-09 PROCEDURE — A9500 TC99M SESTAMIBI: HCPCS | Performed by: RADIOLOGY

## 2022-06-09 PROCEDURE — 93018 CV STRESS TEST I&R ONLY: CPT | Performed by: INTERNAL MEDICINE

## 2022-06-09 PROCEDURE — 78452 HT MUSCLE IMAGE SPECT MULT: CPT | Performed by: INTERNAL MEDICINE

## 2022-06-09 PROCEDURE — 93017 CV STRESS TEST TRACING ONLY: CPT

## 2022-06-09 PROCEDURE — 93016 CV STRESS TEST SUPVJ ONLY: CPT | Performed by: INTERNAL MEDICINE

## 2022-06-09 PROCEDURE — 93306 TTE W/DOPPLER COMPLETE: CPT

## 2022-06-09 PROCEDURE — G0378 HOSPITAL OBSERVATION PER HR: HCPCS

## 2022-06-09 PROCEDURE — 78452 HT MUSCLE IMAGE SPECT MULT: CPT

## 2022-06-09 PROCEDURE — 3430000000 HC RX DIAGNOSTIC RADIOPHARMACEUTICAL: Performed by: RADIOLOGY

## 2022-06-09 PROCEDURE — 99205 OFFICE O/P NEW HI 60 MIN: CPT | Performed by: INTERNAL MEDICINE

## 2022-06-09 RX ADMIN — Medication 30 MILLICURIE: at 09:30

## 2022-06-09 RX ADMIN — INSULIN LISPRO 2 UNITS: 100 INJECTION, SOLUTION INTRAVENOUS; SUBCUTANEOUS at 13:11

## 2022-06-09 RX ADMIN — REGADENOSON 0.4 MG: 0.08 INJECTION, SOLUTION INTRAVENOUS at 10:00

## 2022-06-09 RX ADMIN — Medication 10 MILLICURIE: at 08:15

## 2022-06-09 ASSESSMENT — PAIN SCALES - GENERAL: PAINLEVEL_OUTOF10: 0

## 2022-06-09 NOTE — DISCHARGE INSTR - DIET
Good nutrition is important when healing from an illness, injury, or surgery. Follow any nutrition recommendations given to you during your hospital stay. If you were given an oral nutrition supplement while in the hospital, continue to take this supplement at home. You can take it with meals, in-between meals, and/or before bedtime. These supplements can be purchased at most local grocery stores, pharmacies, and chain [employer]-stores. If you have any questions about your diet or nutrition, call the hospital and ask for the dietitian.   Review information in the Diabetes Survival Guide

## 2022-06-09 NOTE — PLAN OF CARE
Problem: Pain  Goal: Verbalizes/displays adequate comfort level or baseline comfort level  6/9/2022 0310 by Charlee Quigley RN  Outcome: Progressing  6/8/2022 1856 by Jorge Taylor RN  Outcome: Progressing  6/8/2022 1855 by Jogre Taylor RN  Outcome: Progressing     Problem: Safety - Adult  Goal: Free from fall injury  6/9/2022 0310 by Charlee Quigley RN  Outcome: Progressing  6/8/2022 1856 by Jorge Taylor RN  Outcome: Progressing     Problem: Cardiovascular - Adult  Goal: Maintains optimal cardiac output and hemodynamic stability  Outcome: Progressing  Goal: Absence of cardiac dysrhythmias or at baseline  Outcome: Progressing

## 2022-06-09 NOTE — PLAN OF CARE
Problem: Pain  Goal: Verbalizes/displays adequate comfort level or baseline comfort level  6/9/2022 1535 by Kayla Heller RN  Outcome: Adequate for Discharge  6/9/2022 0310 by Viet Tompkins RN  Outcome: Progressing     Problem: Safety - Adult  Goal: Free from fall injury  6/9/2022 1535 by Kayla Heller RN  Outcome: Adequate for Discharge  6/9/2022 0310 by Viet Tompkins RN  Outcome: Progressing     Problem: Cardiovascular - Adult  Goal: Maintains optimal cardiac output and hemodynamic stability  6/9/2022 1535 by Kayla Heller RN  Outcome: Adequate for Discharge  6/9/2022 0310 by Viet Tompkins RN  Outcome: Progressing  Goal: Absence of cardiac dysrhythmias or at baseline  6/9/2022 1535 by Kayla Heller RN  Outcome: Adequate for Discharge  6/9/2022 0310 by Viet Tompkins RN  Outcome: Progressing     Problem: Chronic Conditions and Co-morbidities  Goal: Patient's chronic conditions and co-morbidity symptoms are monitored and maintained or improved  Outcome: Adequate for Discharge

## 2022-06-09 NOTE — DISCHARGE SUMMARY
[facility] Physician Discharge Summary       Anya Bella MD  [facility] [address] [facility]  [phone]    Schedule an appointment as soon as possible for a visit in 1 week  Cardiology    [address] [facility], [address] [place] [address]  [phone]      Follow up hospitalization, medications, and follow up. Activity level: As tolerated     Dispo: Home    Condition on discharge: Stable     Patient ID:  Felecia Brooks  [id number]  80 y.o.  1950    Admit date: 6/8/2022    Discharge date and time:  6/9/2022  3:22 PM    Admission Diagnoses: Principal Problem:    Chest pain  Active Problems:    Type 2 diabetes mellitus, without long-term current use of insulin (HCC)    Primary hypertension    HLD (hyperlipidemia)  Resolved Problems:    * No resolved hospital problems. *      Discharge Diagnoses: Principal Problem:    Chest pain  Active Problems:    Type 2 diabetes mellitus, without long-term current use of insulin (HCC)    Primary hypertension    HLD (hyperlipidemia)  Resolved Problems:    * No resolved hospital problems. *      Consults:  IP CONSULT TO CARDIOLOGY    Hospital Course:   Patient Felecia Brooks is a 80 y.o. presented with Chest pain [R07.9]   Patient presented to the ER with right sided chest pain radiating to shoulder. She was followed and treated for;    1. Chest pain r/o ACS; Pt presented to the ER with sudden onset right chest pain that radiated to her right shoulder. Pain described as \" squeezing\" and \" tight. \" Pain worse with movement. EKG with no ischemia. High sensitivity troponin negative x2. CTA negative for acute findings. Stress test reviewed. Discussed with cardiology- feel may have picked up breast attenuation which showing small area of reversible ischemia. Pt pain resolved. Echo ordered wnl. ok to dc per cardiology.     2. DM 2 : hga1c. Pt takes metformin/ glipizide. Resume on dc     3.  HTN; pt on bisoprolol-HCTZ at home- continue-interchanged while inpt. BP high on admission- 726K systolic. Currently 123/63     4. HLD: statin     Cardiology consulted as not quite sure how to interpret stress test. Read out as myocardial imaging normal- small area of reversible ischemia in the anterior and anteroapical segments. Low to intermediate risk. Discussed case with cardiology and feel likely breast attenuation and no further work up. Ok to [employer]    Patient eager to dc- pain resolved. Pt discharged in stable condition with the following medications, instructions and follow up.        Discharge Exam:  General Appearance: alert and oriented to person, place and time and in no acute distress  Skin: warm and dry  Head: normocephalic and atraumatic  Neck: neck supple and non tender without mass   Pulmonary/Chest: clear to auscultation bilaterally-  Cardiovascular: normal rate, normal S1 and S2 and no carotid bruits  Abdomen: soft, non-tender, non-distended, normal bowel sounds, no masses or organomegaly  Extremities: no cyanosis, no clubbing and no edema  Neurologic:speech normal     No intake/output data recorded. No intake/output data recorded. LABS:  Recent Labs     06/08/22  1324      K 3.6   CL 98   CO2 28   BUN 12   CREATININE 0.8   GLUCOSE 168*   CALCIUM 9.0       Recent Labs     06/08/22  1324   WBC 3.4*   RBC 4.30   HGB 11.7   HCT 36.3   MCV 84.4   MCH 27.2   MCHC 32.2   RDW 11.9      MPV 11.0       No results for input(s): POCGLU in the last 72 hours.     Imaging:  ECHO Complete 2D W Doppler W Color    Result Date: 6/9/2022  Transthoracic Echocardiography Report (TTE)  Demographics   Patient Name    Rupal Roca Gender            Female                  D   Medical Record  [id number]      Room Number       8784  Number   Account #       [de-identified]     Procedure Date    06/09/2022   Corporate ID                  Ordering                                Physician   Accession       [id number]    Referring         Sabrina Kuhn ventricular ejection fraction. The left atrium is mildly dilated. Physiologic and/or trace mitral regurgitation is present. Mild aortic regurgitation is noted. Mild tricuspid regurgitation. There is a trivial circumferential pericardial effusion noted. Signature   ----------------------------------------------------------------  Electronically signed by Jacquelyn Elam MD(Interpreting  physician) on 06/09/2022 11:14 AM  ----------------------------------------------------------------  M-Mode/2D Measurements & Calculations   LV Diastolic    LV Systolic Dimension: 2.6  AV Cusp Separation: 1.5 cmLA  Dimension: 4.2  cm                          Dimension: 3.2 cmAO Root  cm              LV Volume Diastolic: 24.7   Dimension: 2.2 cm  LV FS:38.1 %    ml  LV PW           LV Volume Systolic: 25 ml  Diastolic: 1.1  LV EDV/LV EDV Index: 80.3  cm              ml/45 ml/m^2LV ESV/LV ESV   RV Diastolic Dimension: 3.1 cm  LV PW Systolic: Index: 25 RJ/27ZP/ m^2  1. 3 cm          EF Calculated: 68.9 %       LA/Aorta: 1.45  Septum          LV Mass Index: 88 l/min*m^2 Ascending Aorta: 3 cm  Diastolic: 1.1                              LA volume/Index: 64 ml  cm                                          /36ml/m^2  Septum          LVOT: 2 cm                  RA Area: 95.0 cm^2  Systolic: 1.3  cm                                          IVC Expiration: 1.1 cm  CO: 5.2 l/min  CI: 2.92  l/m*m^2  LV Mass: 157.06  g  Doppler Measurements & Calculations   MV Peak E-Wave:   AV Peak Velocity: 1.36 m/s    LVOT Peak Velocity: 1.11  0.78 m/s          AV Peak Gradient: 7.42 mmHg   m/s  MV Peak A-Wave:   AV Mean Velocity: 0.84 m/s    LVOT Mean Velocity: 0.67  0.89 m/s          AV Mean Gradient: 3.3 mmHg    m/s  MV E/A Ratio:     AV VTI: 27.9 cm               LVOT Peak Gradient: 4.9  0.87              AV Area (Continuity):2.78     mmHgLVOT Mean Gradient:  MV Peak Gradient: cm^2                          2.1 mmHg  3.9 mmHg          AV Deceleration Time: 2343.4  Estimated RVSP: 33.1 mmHg  MV Mean Gradient: msec                          Estimated RAP:3 mmHg  1.5 mmHg          LVOT VTI: 24.7 cm  MV Mean Velocity:  0.54 m/s          Estimated PASP: 33.14 mmHg    TR Velocity:2.75 m/s  MV Deceleration   Pulm. Vein A Reversal         TR Gradient:30.14 mmHg  Time: 247.8 msec  Duration:115.3 msec           PV Peak Velocity: 1.17 m/s  MV P1/2t: 58 msec Pulm. Vein D Velocity:0.43    PV Peak Gradient: 5.5 mmHg  MVA by PHT:3.79   m/sPulm. Vein A Reversal      PV Mean Velocity: 0.78 m/s  cm^2              Velocity:0.29 m/s             PV Mean Gradient: 2.8 mmHg  MV Area           Pulm. Vein S Velocity: 0.66  (continuity): 3   m/s  cm^2  MV E' Septal  Velocity: 0.05  m/s  MV E' Lateral  Velocity: 8 m/s  [email or web address] [id number]    XR CHEST PORTABLE    Result Date: 6/8/2022  EXAMINATION: ONE XRAY VIEW OF THE CHEST 6/8/2022 1:42 pm COMPARISON: None. HISTORY: ORDERING SYSTEM PROVIDED HISTORY: chest pain TECHNOLOGIST PROVIDED HISTORY: Reason for exam:->chest pain FINDINGS: Single AP erect portable chest demonstrate satisfactory expansion lungs which are clear. There are atherosclerotic changes of the aorta without cardiomegaly. There is no evidence of a pneumothorax. No acute cardiopulmonary process. See the CT chest follow-up. CTA CHEST W CONTRAST    Result Date: 6/8/2022  EXAMINATION: CTA OF THE CHEST 6/8/2022 2:46 pm TECHNIQUE: CTA of the chest was performed after the administration of intravenous contrast.  Multiplanar reformatted images are provided for review. MIP images are provided for review. Automated exposure control, iterative reconstruction, and/or weight based adjustment of the mA/kV was utilized to reduce the radiation dose to as low as reasonably achievable. COMPARISON: None.  HISTORY: ORDERING SYSTEM PROVIDED HISTORY: chest pain into the back TECHNOLOGIST PROVIDED HISTORY: Reason for exam:->chest pain into the back Decision Support Exception - unselect if not a suspected or confirmed emergency medical condition->Emergency Medical Condition (MA) FINDINGS: Pulmonary Arteries: Pulmonary arteries are adequately opacified for evaluation. No evidence of intraluminal filling defect to suggest pulmonary embolism. Main pulmonary artery is normal in caliber. Mediastinum: No evidence of mediastinal lymphadenopathy. The heart and pericardium demonstrate no acute abnormality. There is no acute abnormality of the thoracic aorta. Lungs/pleura: The lungs are without acute process. No focal consolidation or pulmonary edema. No evidence of pleural effusion or pneumothorax. Upper Abdomen: Limited images of the upper abdomen reveals stones identified in the gallbladder. Soft Tissues/Bones: No acute bone or soft tissue abnormality. No evidence of pulmonary embolism or acute pulmonary abnormality. NM Cardiac Stress Test Nuclear Imaging    Result Date: 6/9/2022  Indication:  Chest Pain. Clinical History:   Patient has history of coronary artery disease. IMAGING: Myocardial perfusion imaging was performed at rest 30-35 minutes following the intravenous injection of 10.6 mCi of (Tc-Sestamibi) followed by 10 ml of Normal Saline. At peak exercise, the patient was injected intravenously with 31. 2mCi of (Tc-Sestamibi) followed by 10 ml of Normal Saline. Gated post-stress tomographic imaging was performed 20-25 minutes after stress. FINDINGS: The overall quality of the study was adequate. Left ventricular cavity size was noted to be normal. Rotational analog analysis demonstrated no motion artifact The gated SPECT stress imaging in the short, vertical long, and horizontal long axis demonstrated A milddefect was present in the area wall(s) that was smallsized by quantification.          There also was a mild defect present in the anteroapical  wall(s) that was small  sized by quantification. The resting images showed complete reversibility of the small anterior and anteroapical defects. Gated SPECT left ventricular ejection fraction was calculated to be 75%, with normal Myocardial wall motion. The myocardial perfusion imaging was normal. The abnormality was a small area of reversible ischemia in the anterior and anteroapical segments. Left ventricular systolic function was normal, LVEF 75%. Overall low to intermediate risk myocardial perfusion study. No prior study available for comparison.        Patient Instructions:      Medication List      CONTINUE taking these medications    bisoprolol-hydroCHLOROthiazide 2.5-6.25 MG per tablet  Commonly known as: ZIAC     Crestor 10 MG tablet  Generic drug: rosuvastatin     cyclobenzaprine 10 mg tablet  Commonly known as: FLEXERIL     glipiZIDE 10 MG extended release tablet  Commonly known as: GLUCOTROL XL     loratadine 10 MG tablet  Commonly known as: Claritin  Take 1 tablet by mouth daily     meclizine 25 MG Chew  Commonly known as: ANTIVERT     metFORMIN 500 MG tablet  Commonly known as: GLUCOPHAGE     potassium chloride 10 MEQ extended release capsule  Commonly known as: MICRO-K              Note that more than 30 minutes was spent in preparing discharge papers, discussing discharge with patient, medication review, etc.    Signed:  Electronically signed by TRAY Benson on 6/9/2022 at 3:22 PM

## 2022-06-09 NOTE — PROGRESS NOTES
[facility] Progress Note    Admitting Date and Time: 6/8/2022 12:47 PM  Admit Dx: Chest pain [R07.9]    Subjective:  Patient is being followed for Chest pain [R07.9]     Patient feeling much better  Pain right chest resolved  Left upper shoulder discomfort after test         ROS: denies fever, chills, cp, sob, n/v, HA unless stated above.     glipiZIDE  10 mg Oral QAM AC    [Held by provider] metFORMIN  1,000 mg Oral BID WC    rosuvastatin  10 mg Oral Daily    sodium chloride flush  5-40 mL IntraVENous 2 times per day    enoxaparin  40 mg SubCUTAneous Daily    metoprolol succinate  25 mg Oral Daily    And    hydroCHLOROthiazide  6.25 mg Oral Daily    insulin lispro  0-12 Units SubCUTAneous TID WC    insulin lispro  0-6 Units SubCUTAneous Nightly     [COMPLETED] technetium sestamibi, 30 millicurie, ONCE PRN  sodium chloride flush, 5-40 mL, PRN  sodium chloride, , PRN  ondansetron, 4 mg, Q8H PRN   Or  ondansetron, 4 mg, Q6H PRN  polyethylene glycol, 17 g, Daily PRN  acetaminophen, 650 mg, Q6H PRN   Or  acetaminophen, 650 mg, Q6H PRN  glucose, 4 tablet, PRN  dextrose bolus, 125 mL, PRN   Or  dextrose bolus, 250 mL, PRN  glucagon (rDNA), 1 mg, PRN  dextrose, 100 mL/hr, PRN  regadenoson, 0.4 mg, ONCE PRN         Objective:    /63   Pulse 59   Temp 97.9 °F (36.6 °C) (Oral)   Resp 16   Ht 5' 6\" (1.676 m)   Wt 152 lb (68.9 kg)   SpO2 96%   BMI 24.53 kg/m²   General Appearance: alert and oriented to person, place and time and in no acute distress  Skin: warm and dry  Head: normocephalic and atraumatic  Neck: neck supple and non tender without mass   Pulmonary/Chest: clear to auscultation bilaterally- =  Cardiovascular: normal rate, normal S1 and S2 and no carotid bruits  Abdomen: soft, non-tender, non-distended, normal bowel sounds, no masses or organomegaly  Extremities: no cyanosis, no clubbing and no edema  Neurologic: speech normal         Recent Labs     06/08/22  1324    K 3.6   CL 98   CO2 28   BUN 12   CREATININE 0.8   GLUCOSE 168*   CALCIUM 9.0       Recent Labs     06/08/22  1324   WBC 3.4*   RBC 4.30   HGB 11.7   HCT 36.3   MCV 84.4   MCH 27.2   MCHC 32.2   RDW 11.9      MPV 11.0           Assessment:    Principal Problem:    Chest pain  Active Problems:    Type 2 diabetes mellitus, without long-term current use of insulin (HCC)    Primary hypertension    HLD (hyperlipidemia)  Resolved Problems:    * No resolved hospital problems. *      Plan:  1. Chest pain r/o ACS; Pt presented to the ER with sudden onset right chest pain that radiated to her right shoulder. Pain described as \" squeezing\" and \" tight. \" Pain worse with movement. EKG with no ischemia. High sensitivity troponin negative x2. CTA negative for acute findings. Plans for stress test today. Echo ordered. Monitor symptoms. 2. DM 2 : hga1c. Pt takes metformin/ glipizide. Resume on dc    3. HTN; pt on bisoprolol-HCTZ at home- continue-interchanged while inpt. BP high on admission- 296O systolic. Currently 123/63    4. HLD: statin       ADDENDUM:pt reporting pain resolved. She does report left upper shoulder/ neck pain after test- reporting it may have been how she was lying. Stress test reviewed with attending. Consult cardiology for input. NOTE: This report was transcribed using voice recognition software. Every effort was made to ensure accuracy; however, inadvertent computerized transcription errors may be present.   Electronically signed by TRAY Clarke on 6/9/2022 at 8:56 AM

## 2022-06-09 NOTE — PROGRESS NOTES
P Quality Flow/Interdisciplinary Rounds Progress Note        Quality Flow Rounds held on June 9, 2022    Disciplines Attending:  Bedside Nurse,  and Nursing Unit Leadership    Tasha Reyes was admitted on 6/8/2022 12:47 PM    Anticipated Discharge Date:       Disposition:    Damian Score:  Damian Scale Score: 21    Readmission Risk              Risk of Unplanned Readmission:  0           Discussed patient goal for the day, patient clinical progression, and barriers to discharge. The following Goal(s) of the Day/Commitment(s) have been identified:  stress test today.       Valerie Caldera RN  June 9, 2022

## 2022-06-09 NOTE — CONSULTS
INPATIENT CARDIOLOGY CONSULT    Name: Stephanie Chen    Age: 80 y.o. Date of Admission: 6/8/2022 12:47 PM    Date of Service: 6/9/2022    Reason for Consultation: Noncardiac chest pain, hypertension    Referring Physician: Herberth Davalos MD    History of Present Illness: Patient is a 41-year-old black female with no association to [facility] and no known history of structural heart disease with a risk profile inclusive of hypertension, diabetes and hyperlipidemia. She is normally active with functional capabilities of approximately 5 METs and denies symptoms of anginal-like chest discomfort or other ischemic equivalents, decompensated left ventricular salt dysfunction or volume overload nor arrhythmia related manifestations. She presented following the development of a squeezing localized discomfort of her right precordium of constant duration over 24 hours and without radiation or associated ischemic equivalents and unaffected by activity. On the day of her admission the intensity of symptoms worsened and radiated to her right scapula. She denied any additional manifestations of decompensated left ventricular systolic dysfunction or volume overload. At the time of her emergency room evaluation a resting electrocardiogram reviewed at the time of assessment demonstrated evidence of sinus rhythm with a delayed precordial transition zone and a chest x-ray again reviewed demonstrated no evidence of cardiomegaly or infiltrate with no additional significant cardiac or thoracic pathology on a contrast CT angiogram.  Additional laboratory assessment demonstrated normal high-sensitivity troponin levels on a serial basis with a glycosylated hemoglobin level of 8%.   An echocardiogram demonstrated borderline concentric left ventricular hypertrophy with normal left ventricular systolic function and an incidental finding of a trivial pericardial effusion as well as that of perfusion imaging which upon personal Session: Not on file   Stress:     Feeling of Stress : Not on file   Social Connections:     Frequency of Communication with Friends and Family: Not on file    Frequency of Social Gatherings with Friends and Family: Not on file    Attends Rastafari Services: Not on file    Active Member of Clubs or Organizations: Not on file    Attends Club or Organization Meetings: Not on file    Marital Status: Not on file   Intimate Partner Violence:     Fear of Current or Ex-Partner: Not on file    Emotionally Abused: Not on file    Physically Abused: Not on file    Sexually Abused: Not on file   Housing Stability:     Unable to Pay for Housing in the Last Year: Not on file    Number of John in the Last Year: Not on file    Unstable Housing in the Last Year: Not on file       Allergies: Allergies   Allergen Reactions    Iv Dye [Iodides] Other (See Comments)     Pt states she had tingling in her hands and feet       Home Medications:  Prior to Admission medications    Medication Sig Start Date End Date Taking? Authorizing Provider   glipiZIDE (GLUCOTROL XL) 10 MG extended release tablet Take 20 mg by mouth 2 times daily     Historical Provider, MD   loratadine (CLARITIN) 10 MG tablet Take 1 tablet by mouth daily  Patient not taking: Reported on 6/8/2022 8/22/17   Quincy Huang, DO   bisoprolol-hydrochlorothiazide [facility]) 2.5-6.25 MG per tablet Take 1 tablet by mouth daily. Historical Provider, MD   potassium chloride (MICRO-K) 10 MEQ CR capsule Take 10 mEq by mouth daily     Historical Provider, MD   meclizine (ANTIVERT) 25 MG CHEW Take 25 mg by mouth daily Takes 1 pill daily, prescribed TID PRN    Historical Provider, MD   cyclobenzaprine (FLEXERIL) 10 MG tablet Take 10 mg by mouth 3 times daily as needed for Muscle spasms. Historical Provider, MD   rosuvastatin (CRESTOR) 10 MG tablet Take 10 mg by mouth daily.     Historical Provider, MD   metFORMIN (GLUCOPHAGE) 500 MG tablet Take 1,000 mg by mouth 2 times daily (with meals)     Historical Provider, MD       Current Medications:  Current Facility-Administered Medications   Medication Dose Route Frequency Provider Last Rate Last Admin    glipiZIDE (GLUCOTROL) tablet 10 mg  10 mg Oral QAM AC Lexi J Cain, DO        [Held by provider] metFORMIN (GLUCOPHAGE) tablet 1,000 mg  1,000 mg Oral BID WC Lexi J Brightwood, DO        rosuvastatin (CRESTOR) tablet 10 mg  10 mg Oral Daily Lexi J Brightwood, DO   10 mg at 06/08/22 1743    sodium chloride flush 0.9 % injection 5-40 mL  5-40 mL IntraVENous 2 times per day Ruddy Virgen, DO   10 mL at 06/08/22 2129    sodium chloride flush 0.9 % injection 5-40 mL  5-40 mL IntraVENous PRN Lexi GIPSON Cain, DO        0.9 % sodium chloride infusion   IntraVENous PRN Lexi Roweon, DO        enoxaparin (LOVENOX) injection 40 mg  40 mg SubCUTAneous Daily Lexi Roweon, DO   40 mg at 06/08/22 1744    ondansetron (ZOFRAN-ODT) disintegrating tablet 4 mg  4 mg Oral Q8H PRN Lexi GIPSON Brightwood, DO        Or    ondansetron (ZOFRAN) injection 4 mg  4 mg IntraVENous Q6H PRN Lexi GIPSON Cain, DO        polyethylene glycol (GLYCOLAX) packet 17 g  17 g Oral Daily PRN Lexi GIPSON Cain, DO        acetaminophen (TYLENOL) tablet 650 mg  650 mg Oral Q6H PRN Lexi GIPSON Cain, DO        Or    acetaminophen (TYLENOL) suppository 650 mg  650 mg Rectal Q6H PRN Lexi GIPSON Brightwood, DO        glucose chewable tablet 16 g  4 tablet Oral PRN Lexi GIPSON Cain, DO        dextrose bolus 10% 125 mL  125 mL IntraVENous PRN Lexi GIPSON Cain, DO        Or    dextrose bolus 10% 250 mL  250 mL IntraVENous PRN Lexi GIPSON Cain, DO        glucagon (rDNA) injection 1 mg  1 mg IntraMUSCular PRN Lexi J Cain, DO        dextrose 5 % solution  100 mL/hr IntraVENous PRN Lexi GIPSON Brightwood, DO        metoprolol succinate (TOPROL XL) extended release tablet 25 mg  25 mg Oral Daily Lexi J Cain, DO   25 mg at 06/08/22 1743    And    hydroCHLOROthiazide (HYDRODIURIL) tablet 6.25 mg  6.25 mg Oral Daily Lexi Barlow, DO   6.25 mg at 06/08/22 1744    insulin lispro (HUMALOG) injection vial 0-12 Units  0-12 Units SubCUTAneous TID WC Ulysses Posey, APRN - CNP   2 Units at 06/09/22 1311    insulin lispro (HUMALOG) injection vial 0-6 Units  0-6 Units SubCUTAneous Nightly Ulysses Posey, APRN - CNP   5 Units at 06/08/22 2132      sodium chloride      dextrose           Physical Exam:  /74   Pulse 62   Temp 97.7 °F (36.5 °C) (Oral)   Resp 16   Ht 5' 6\" (1.676 m)   Wt 152 lb (68.9 kg)   SpO2 96%   BMI 24.53 kg/m²   Weight change: Wt Readings from Last 3 Encounters:   06/08/22 152 lb (68.9 kg)   08/22/17 150 lb (68 kg)   06/26/17 150 lb (68 kg)     The patient is awake, alert and in no discomfort or distress. No gross musculoskeletal deformity or lymphadenopathy are present. No significant skin or nail changes are present. Gross examination of head, eyes, nose and throat are negative. Jugular venous pressure is normal and no carotid bruits are present. No thyromegaly is noted. Normal respiratory effort is noted with no accessory muscle usage present. Lung fields are clear to ascultation. Cardiac examination is notable for a regular rate and rhythm with no palpable thrill. No gallop rhythm or cardiac murmur are identified. A benign abdominal examination is present with no masses or organomegaly. A normal abdominal aortic pulsation is present. Intact pulses are present throughout all extremities and no peripheral edema is present. No focal neurologic deficits are present. Intake/Output:  No intake or output data in the 24 hours ending 06/09/22 1620  No intake/output data recorded. Laboratory Tests:  Lab Results   Component Value Date    CREATININE 0.8 06/08/2022    BUN 12 06/08/2022     06/08/2022    K 3.6 06/08/2022    CL 98 06/08/2022    CO2 28 06/08/2022     No results for input(s): CKTOTAL, CKMB in the last 72 hours.     Invalid input(s): TROPONONI  No results found for: BNP  Lab Results   Component Value Date    WBC 3.4 06/08/2022    RBC 4.30 06/08/2022    HGB 11.7 06/08/2022    HCT 36.3 06/08/2022    MCV 84.4 06/08/2022    MCH 27.2 06/08/2022    MCHC 32.2 06/08/2022    RDW 11.9 06/08/2022     06/08/2022    MPV 11.0 06/08/2022     Recent Labs     06/08/22  1324   ALKPHOS 64   ALT 19   AST 25   PROT 6.8   BILITOT 0.6   LABALBU 4.3     No results found for: MG  No results found for: PROTIME, INR  No results found for: TSH  No components found for: CHLPL  Lab Results   Component Value Date    TRIG 58 06/08/2022     Lab Results   Component Value Date    HDL 60 06/08/2022     Lab Results   Component Value Date    LDLCALC 86 06/08/2022         Cardiac Tests:  ECG: A resting electrocardiogram reviewed at the time of evaluation demonstrates evidence of sinus rhythm with a delayed precordial transition zone  Telemetry findings reviewed: sinus rhythm with occasional ventricular ectopy, no new tachy/bradyarrhythmias overnight  Chest X-ray: Chest x-ray reviewed at the time evaluation demonstrates no evidence of cardiomegaly or infiltrate  Last Echocardiogram: Echocardiogram demonstrated evidence of a normal-sized left ventricular chamber with borderline concentric left ventricular hypertrophy with no regional wall motion apneas and normal left ventricular systolic function.   Mild left atrial enlargement is present with no significant valvular abnormalities and a trivial pericardial effusion  Last stress test: A gated vasodilator myocardial perfusion imaging study reportedly demonstrated evidence of a small anterior and anterolateral perfusion abnormality in the absence of regional wall motion abnormalities and normal left ventricular systolic function with personally reviewed demonstrating significant breast attenuation more prominently noted on the post regadenoson and resting images accounting for the defect based on differential breast positioning      ASSESSMENT / PLAN: On a clinical basis, the patient presents with noncardiac chest discomfort in the face of a risk profile associated with an intermediate risk of coronary atherosclerosis and an echocardiogram demonstrating an incidental finding of a trivial pericardial effusion as well as that of a low risk perfusion imaging study with evidence of breast attenuation differential between that of her post regadenoson and resting images. These all suggest a low risk of acute ischemic events and on this basis no additional needs of cardiovascular evaluation with ongoing needs of appropriate risk factor modification of blood pressure, diabetes and serum lipids to reduce risk of future atherosclerotic development. We will further evaluate her should additional cardiovascular difficulties or concerns arise. Thank you for allowing me to participate in your patient's care. Please feel free to contact me if you have any questions or concerns. Note: This report was completed using computerized voice recognition software. Every effort has been made to ensure accuracy, however; inadvertent computerized transcription errors may be present. Lexx Kumari.  Lico Velez, [facility]

## 2022-06-09 NOTE — PROCEDURES
Kirstin Ho Nuclear Stress Test:    Cardiologist: Dr. Jose Armando Herron EKG: NSR, NSST, poor R wave progression, abnormal EKG    Indications for study: Chest pain    1. No chest pain  2. No new arrhythmias  3. No EKG changes suggestive of stress induced ischemia  4. Nuclear images pending    Shreya Zamora MD., [facility] [place].    [facility]) Cardiology

## 2022-06-09 NOTE — PROGRESS NOTES
Patient for pharmacological stress test tomorrow. She has an allergy to IV Dye. Notified Kiran Patel NP.  Kiran Patel NP made this nurse aware that she should not be receiving any contrast or IV dye during the procedure and does not need any medication to prepare for the test.

## 2022-06-09 NOTE — PROGRESS NOTES
Blood glucose 388. Paresh Me CNP notified. Order received to change Humalog sliding scale to medium dose correction algorithm.

## 2023-08-07 ENCOUNTER — APPOINTMENT (OUTPATIENT)
Dept: CT IMAGING | Age: 73
End: 2023-08-07
Payer: MEDICARE

## 2023-08-07 ENCOUNTER — HOSPITAL ENCOUNTER (EMERGENCY)
Age: 73
Discharge: HOME | End: 2023-08-08
Attending: EMERGENCY MEDICINE
Payer: MEDICARE

## 2023-08-07 ENCOUNTER — APPOINTMENT (OUTPATIENT)
Dept: GENERAL RADIOLOGY | Age: 73
End: 2023-08-07
Payer: MEDICARE

## 2023-08-07 DIAGNOSIS — R41.82 ALTERED MENTAL STATUS, UNSPECIFIED ALTERED MENTAL STATUS TYPE: Primary | ICD-10-CM

## 2023-08-07 LAB
ANION GAP SERPL CALCULATED.3IONS-SCNC: 11 MMOL/L (ref 7–16)
BASOPHILS # BLD: 0.07 K/UL (ref 0–0.2)
BASOPHILS NFR BLD: 1 % (ref 0–2)
BILIRUB UR QL STRIP: NEGATIVE
BUN SERPL-MCNC: 14 MG/DL (ref 6–23)
CALCIUM SERPL-MCNC: 9.4 MG/DL (ref 8.6–10.2)
CHLORIDE SERPL-SCNC: 99 MMOL/L (ref 98–107)
CHP ED QC CHECK: YES
CLARITY UR: CLEAR
CO2 SERPL-SCNC: 28 MMOL/L (ref 22–29)
COLOR UR: YELLOW
CREAT SERPL-MCNC: 0.8 MG/DL (ref 0.5–1)
EOSINOPHIL # BLD: 0.14 K/UL (ref 0.05–0.5)
EOSINOPHILS RELATIVE PERCENT: 3 % (ref 0–6)
ERYTHROCYTE [DISTWIDTH] IN BLOOD BY AUTOMATED COUNT: 12.2 % (ref 11.5–15)
GFR SERPL CREATININE-BSD FRML MDRD: >60 ML/MIN/1.73M2
GLUCOSE BLD-MCNC: 128 MG/DL
GLUCOSE SERPL-MCNC: 150 MG/DL (ref 74–99)
GLUCOSE UR STRIP-MCNC: NEGATIVE MG/DL
HCT VFR BLD AUTO: 37.6 % (ref 34–48)
HGB BLD-MCNC: 12.2 G/DL (ref 11.5–15.5)
HGB UR QL STRIP.AUTO: NEGATIVE
IMM GRANULOCYTES # BLD AUTO: <0.03 K/UL (ref 0–0.58)
IMM GRANULOCYTES NFR BLD: 0 % (ref 0–5)
KETONES UR STRIP-MCNC: NEGATIVE MG/DL
LEUKOCYTE ESTERASE UR QL STRIP: ABNORMAL
LYMPHOCYTES NFR BLD: 1.37 K/UL (ref 1.5–4)
LYMPHOCYTES RELATIVE PERCENT: 25 % (ref 20–42)
MCH RBC QN AUTO: 27.7 PG (ref 26–35)
MCHC RBC AUTO-ENTMCNC: 32.4 G/DL (ref 32–34.5)
MCV RBC AUTO: 85.3 FL (ref 80–99.9)
METER GLUCOSE: 128 MG/DL (ref 74–99)
MONOCYTES NFR BLD: 0.54 K/UL (ref 0.1–0.95)
MONOCYTES NFR BLD: 10 % (ref 2–12)
NEUTROPHILS NFR BLD: 61 % (ref 43–80)
NEUTS SEG NFR BLD: 3.34 K/UL (ref 1.8–7.3)
NITRITE UR QL STRIP: NEGATIVE
PH UR STRIP: 6 [PH] (ref 5–9)
PLATELET # BLD AUTO: 206 K/UL (ref 130–450)
PMV BLD AUTO: 11 FL (ref 7–12)
POTASSIUM SERPL-SCNC: 3.9 MMOL/L (ref 3.5–5)
PROT UR STRIP-MCNC: NEGATIVE MG/DL
RBC # BLD AUTO: 4.41 M/UL (ref 3.5–5.5)
RBC #/AREA URNS HPF: NORMAL /HPF
SODIUM SERPL-SCNC: 138 MMOL/L (ref 132–146)
SP GR UR STRIP: <1.005 (ref 1–1.03)
TROPONIN I SERPL HS-MCNC: 8 NG/L (ref 0–9)
UROBILINOGEN UR STRIP-ACNC: 0.2 EU/DL (ref 0–1)
WBC #/AREA URNS HPF: NORMAL /HPF
WBC OTHER # BLD: 5.5 K/UL (ref 4.5–11.5)

## 2023-08-07 PROCEDURE — 84484 ASSAY OF TROPONIN QUANT: CPT

## 2023-08-07 PROCEDURE — 82947 ASSAY GLUCOSE BLOOD QUANT: CPT

## 2023-08-07 PROCEDURE — 81001 URINALYSIS AUTO W/SCOPE: CPT

## 2023-08-07 PROCEDURE — 71045 X-RAY EXAM CHEST 1 VIEW: CPT

## 2023-08-07 PROCEDURE — 99285 EMERGENCY DEPT VISIT HI MDM: CPT

## 2023-08-07 PROCEDURE — 70450 CT HEAD/BRAIN W/O DYE: CPT

## 2023-08-07 PROCEDURE — 93005 ELECTROCARDIOGRAM TRACING: CPT

## 2023-08-07 PROCEDURE — 85025 COMPLETE CBC W/AUTO DIFF WBC: CPT

## 2023-08-07 PROCEDURE — 80048 BASIC METABOLIC PNL TOTAL CA: CPT

## 2023-08-07 ASSESSMENT — PAIN - FUNCTIONAL ASSESSMENT: PAIN_FUNCTIONAL_ASSESSMENT: NONE - DENIES PAIN

## 2023-08-07 ASSESSMENT — LIFESTYLE VARIABLES
HOW OFTEN DO YOU HAVE A DRINK CONTAINING ALCOHOL: NEVER
HOW MANY STANDARD DRINKS CONTAINING ALCOHOL DO YOU HAVE ON A TYPICAL DAY: PATIENT DOES NOT DRINK

## 2023-08-08 VITALS
HEIGHT: 67 IN | BODY MASS INDEX: 23.54 KG/M2 | OXYGEN SATURATION: 100 % | RESPIRATION RATE: 14 BRPM | DIASTOLIC BLOOD PRESSURE: 60 MMHG | HEART RATE: 70 BPM | TEMPERATURE: 98 F | WEIGHT: 150 LBS | SYSTOLIC BLOOD PRESSURE: 148 MMHG

## 2023-08-08 LAB
EKG ATRIAL RATE: 66 BPM
EKG P AXIS: 5 DEGREES
EKG P-R INTERVAL: 182 MS
EKG Q-T INTERVAL: 392 MS
EKG QRS DURATION: 82 MS
EKG QTC CALCULATION (BAZETT): 410 MS
EKG R AXIS: 78 DEGREES
EKG T AXIS: 43 DEGREES
EKG VENTRICULAR RATE: 66 BPM

## 2023-08-08 PROCEDURE — 93010 ELECTROCARDIOGRAM REPORT: CPT | Performed by: INTERNAL MEDICINE

## 2023-08-08 ASSESSMENT — PAIN SCALES - GENERAL: PAINLEVEL_OUTOF10: 0

## 2023-08-08 ASSESSMENT — PAIN - FUNCTIONAL ASSESSMENT: PAIN_FUNCTIONAL_ASSESSMENT: 0-10

## 2023-08-08 NOTE — ED PROVIDER NOTES
of disposition. My findings/plan: The encounter diagnosis was Altered mental status, unspecified altered mental status type.   New Prescriptions    No medications on file     Boubacar Caballero, [address], DO  08/08/23 [id number]

## 2024-09-13 ENCOUNTER — OFFICE VISIT (OUTPATIENT)
Dept: FAMILY MEDICINE CLINIC | Age: 74
End: 2024-09-13
Payer: MEDICARE

## 2024-09-13 VITALS
RESPIRATION RATE: 16 BRPM | DIASTOLIC BLOOD PRESSURE: 92 MMHG | HEIGHT: 67 IN | SYSTOLIC BLOOD PRESSURE: 162 MMHG | OXYGEN SATURATION: 98 % | HEART RATE: 88 BPM | BODY MASS INDEX: 22.76 KG/M2 | TEMPERATURE: 98.3 F | WEIGHT: 145 LBS

## 2024-09-13 DIAGNOSIS — E11.9 TYPE 2 DIABETES MELLITUS WITHOUT COMPLICATION, WITHOUT LONG-TERM CURRENT USE OF INSULIN (HCC): Primary | ICD-10-CM

## 2024-09-13 DIAGNOSIS — E78.00 PURE HYPERCHOLESTEROLEMIA: ICD-10-CM

## 2024-09-13 DIAGNOSIS — I10 PRIMARY HYPERTENSION: ICD-10-CM

## 2024-09-13 DIAGNOSIS — L81.9 SKIN HYPOPIGMENTATION: ICD-10-CM

## 2024-09-13 PROCEDURE — G8400 PT W/DXA NO RESULTS DOC: HCPCS | Performed by: FAMILY MEDICINE

## 2024-09-13 PROCEDURE — G8420 CALC BMI NORM PARAMETERS: HCPCS | Performed by: FAMILY MEDICINE

## 2024-09-13 PROCEDURE — 2022F DILAT RTA XM EVC RTNOPTHY: CPT | Performed by: FAMILY MEDICINE

## 2024-09-13 PROCEDURE — 1090F PRES/ABSN URINE INCON ASSESS: CPT | Performed by: FAMILY MEDICINE

## 2024-09-13 PROCEDURE — 1036F TOBACCO NON-USER: CPT | Performed by: FAMILY MEDICINE

## 2024-09-13 PROCEDURE — 3077F SYST BP >= 140 MM HG: CPT | Performed by: FAMILY MEDICINE

## 2024-09-13 PROCEDURE — 3080F DIAST BP >= 90 MM HG: CPT | Performed by: FAMILY MEDICINE

## 2024-09-13 PROCEDURE — 99204 OFFICE O/P NEW MOD 45 MIN: CPT | Performed by: FAMILY MEDICINE

## 2024-09-13 PROCEDURE — 1123F ACP DISCUSS/DSCN MKR DOCD: CPT | Performed by: FAMILY MEDICINE

## 2024-09-13 PROCEDURE — 3017F COLORECTAL CA SCREEN DOC REV: CPT | Performed by: FAMILY MEDICINE

## 2024-09-13 PROCEDURE — 3046F HEMOGLOBIN A1C LEVEL >9.0%: CPT | Performed by: FAMILY MEDICINE

## 2024-09-13 PROCEDURE — G8427 DOCREV CUR MEDS BY ELIG CLIN: HCPCS | Performed by: FAMILY MEDICINE

## 2024-09-13 RX ORDER — OLMESARTAN MEDOXOMIL 40 MG/1
40 TABLET ORAL DAILY
Qty: 30 TABLET | Refills: 5 | Status: SHIPPED
Start: 2024-09-13 | End: 2024-09-13

## 2024-09-13 RX ORDER — OLMESARTAN MEDOXOMIL 40 MG/1
40 TABLET ORAL DAILY
Qty: 90 TABLET | Refills: 2 | Status: SHIPPED | OUTPATIENT
Start: 2024-09-13

## 2024-09-13 SDOH — ECONOMIC STABILITY: FOOD INSECURITY: WITHIN THE PAST 12 MONTHS, YOU WORRIED THAT YOUR FOOD WOULD RUN OUT BEFORE YOU GOT MONEY TO BUY MORE.: NEVER TRUE

## 2024-09-13 SDOH — ECONOMIC STABILITY: INCOME INSECURITY: HOW HARD IS IT FOR YOU TO PAY FOR THE VERY BASICS LIKE FOOD, HOUSING, MEDICAL CARE, AND HEATING?: NOT HARD AT ALL

## 2024-09-13 SDOH — ECONOMIC STABILITY: FOOD INSECURITY: WITHIN THE PAST 12 MONTHS, THE FOOD YOU BOUGHT JUST DIDN'T LAST AND YOU DIDN'T HAVE MONEY TO GET MORE.: NEVER TRUE

## 2024-09-13 ASSESSMENT — PATIENT HEALTH QUESTIONNAIRE - PHQ9
1. LITTLE INTEREST OR PLEASURE IN DOING THINGS: NOT AT ALL
2. FEELING DOWN, DEPRESSED OR HOPELESS: NOT AT ALL
SUM OF ALL RESPONSES TO PHQ QUESTIONS 1-9: 0
SUM OF ALL RESPONSES TO PHQ9 QUESTIONS 1 & 2: 0
SUM OF ALL RESPONSES TO PHQ QUESTIONS 1-9: 0

## 2024-10-18 ENCOUNTER — TELEPHONE (OUTPATIENT)
Dept: FAMILY MEDICINE CLINIC | Age: 74
End: 2024-10-18

## 2024-10-18 RX ORDER — MECLIZINE HCL 25MG 25 MG/1
25 TABLET, CHEWABLE ORAL DAILY
Qty: 90 TABLET | Refills: 3 | Status: SHIPPED | OUTPATIENT
Start: 2024-10-18

## 2024-10-18 RX ORDER — AZITHROMYCIN 250 MG/1
TABLET, FILM COATED ORAL
Qty: 6 TABLET | Refills: 0 | Status: SHIPPED | OUTPATIENT
Start: 2024-10-18

## 2024-10-18 RX ORDER — OLMESARTAN MEDOXOMIL 40 MG/1
40 TABLET ORAL DAILY
Qty: 90 TABLET | Refills: 2 | Status: SHIPPED | OUTPATIENT
Start: 2024-10-18

## 2024-10-18 NOTE — TELEPHONE ENCOUNTER
Tammy called and said she has a cough that's the only symptom she has-she used to get bronchitis all the time and she hasn't had a cold in 20 in years and wanted a zpack sent in

## 2024-10-24 ENCOUNTER — OFFICE VISIT (OUTPATIENT)
Dept: FAMILY MEDICINE CLINIC | Age: 74
End: 2024-10-24

## 2024-10-24 VITALS
BODY MASS INDEX: 22.44 KG/M2 | HEART RATE: 82 BPM | RESPIRATION RATE: 16 BRPM | SYSTOLIC BLOOD PRESSURE: 144 MMHG | OXYGEN SATURATION: 99 % | DIASTOLIC BLOOD PRESSURE: 60 MMHG | HEIGHT: 67 IN | WEIGHT: 143 LBS | TEMPERATURE: 97.3 F

## 2024-10-24 DIAGNOSIS — L80 VITILIGO: ICD-10-CM

## 2024-10-24 DIAGNOSIS — Z00.00 INITIAL MEDICARE ANNUAL WELLNESS VISIT: Primary | ICD-10-CM

## 2024-10-24 DIAGNOSIS — E11.9 TYPE 2 DIABETES MELLITUS WITHOUT COMPLICATION, WITHOUT LONG-TERM CURRENT USE OF INSULIN (HCC): ICD-10-CM

## 2024-10-24 DIAGNOSIS — E78.00 PURE HYPERCHOLESTEROLEMIA: ICD-10-CM

## 2024-10-24 LAB
CREATININE URINE POCT: 100
MICROALBUMIN/CREAT 24H UR: 30 MG/DL
MICROALBUMIN/CREAT UR-RTO: <30 MG/G

## 2024-10-24 RX ORDER — KETOCONAZOLE 20 MG/ML
SHAMPOO TOPICAL
Qty: 120 ML | Refills: 2 | Status: SHIPPED | OUTPATIENT
Start: 2024-10-24

## 2024-10-24 RX ORDER — FLUCONAZOLE 150 MG/1
150 TABLET ORAL ONCE
Qty: 1 TABLET | Refills: 0 | Status: SHIPPED | OUTPATIENT
Start: 2024-10-24 | End: 2024-10-24

## 2024-10-24 RX ORDER — RUXOLITINIB 15 MG/G
CREAM TOPICAL
Qty: 60 G | Refills: 2 | Status: SHIPPED | OUTPATIENT
Start: 2024-10-24

## 2024-10-24 ASSESSMENT — PATIENT HEALTH QUESTIONNAIRE - PHQ9
2. FEELING DOWN, DEPRESSED OR HOPELESS: NOT AT ALL
SUM OF ALL RESPONSES TO PHQ QUESTIONS 1-9: 0
SUM OF ALL RESPONSES TO PHQ9 QUESTIONS 1 & 2: 0
SUM OF ALL RESPONSES TO PHQ QUESTIONS 1-9: 0
SUM OF ALL RESPONSES TO PHQ QUESTIONS 1-9: 0
DEPRESSION UNABLE TO ASSESS: PT REFUSES
SUM OF ALL RESPONSES TO PHQ9 QUESTIONS 1 & 2: 0
1. LITTLE INTEREST OR PLEASURE IN DOING THINGS: NOT AT ALL
1. LITTLE INTEREST OR PLEASURE IN DOING THINGS: NOT AT ALL
SUM OF ALL RESPONSES TO PHQ QUESTIONS 1-9: 0
2. FEELING DOWN, DEPRESSED OR HOPELESS: NOT AT ALL
SUM OF ALL RESPONSES TO PHQ QUESTIONS 1-9: 0

## 2024-10-24 NOTE — PROGRESS NOTES
MD Suly   metFORMIN (GLUCOPHAGE) 500 MG tablet Take 2 tablets by mouth 2 times daily (with meals) Yes Provider, MD Suly       CareTeam (Including outside providers/suppliers regularly involved in providing care):   Patient Care Team:  Liz Goldman MD as PCP - General (Family Medicine)  Liz Goldman MD as PCP - Empaneled Provider      Reviewed and updated this visit:  Tobacco  Allergies  Meds  Med Hx  Surg Hx  Soc Hx  Fam Hx

## 2024-11-11 ENCOUNTER — TELEPHONE (OUTPATIENT)
Dept: FAMILY MEDICINE CLINIC | Age: 74
End: 2024-11-11

## 2024-11-11 NOTE — TELEPHONE ENCOUNTER
UTI medication requested. PT thinks it's from Jardience...would like to try something different. Does not want to take opzelura...fearful of cancer side effect.

## 2024-11-12 RX ORDER — NITROFURANTOIN 25; 75 MG/1; MG/1
100 CAPSULE ORAL 2 TIMES DAILY
Qty: 6 CAPSULE | Refills: 0 | Status: SHIPPED | OUTPATIENT
Start: 2024-11-12 | End: 2024-11-15

## 2024-11-26 ENCOUNTER — TELEPHONE (OUTPATIENT)
Dept: FAMILY MEDICINE CLINIC | Age: 74
End: 2024-11-26

## 2024-11-26 NOTE — TELEPHONE ENCOUNTER
Tammy called and said that Jardiance is giving her an infection again and the last time this happened a pill was sent in for her that cleared it right up

## 2024-11-27 RX ORDER — FLUCONAZOLE 150 MG/1
150 TABLET ORAL ONCE
Qty: 1 TABLET | Refills: 0 | Status: SHIPPED | OUTPATIENT
Start: 2024-11-27 | End: 2024-11-27

## 2024-12-04 DIAGNOSIS — E78.00 PURE HYPERCHOLESTEROLEMIA: ICD-10-CM

## 2024-12-04 DIAGNOSIS — E11.9 TYPE 2 DIABETES MELLITUS WITHOUT COMPLICATION, WITHOUT LONG-TERM CURRENT USE OF INSULIN (HCC): ICD-10-CM

## 2024-12-04 LAB
ALBUMIN: 4.3 G/DL (ref 3.5–5.2)
ALP BLD-CCNC: 64 U/L (ref 35–104)
ALT SERPL-CCNC: 20 U/L (ref 0–32)
ANION GAP SERPL CALCULATED.3IONS-SCNC: 9 MMOL/L (ref 7–16)
AST SERPL-CCNC: 27 U/L (ref 0–31)
BILIRUB SERPL-MCNC: 0.6 MG/DL (ref 0–1.2)
BUN BLDV-MCNC: 15 MG/DL (ref 6–23)
CALCIUM SERPL-MCNC: 9.3 MG/DL (ref 8.6–10.2)
CHLORIDE BLD-SCNC: 98 MMOL/L (ref 98–107)
CHOLESTEROL, TOTAL: 180 MG/DL
CO2: 30 MMOL/L (ref 22–29)
CREAT SERPL-MCNC: 0.8 MG/DL (ref 0.5–1)
GFR, ESTIMATED: 79 ML/MIN/1.73M2
GLUCOSE BLD-MCNC: 157 MG/DL (ref 74–99)
HBA1C MFR BLD: 8.9 % (ref 4–5.6)
HCT VFR BLD CALC: 40.5 % (ref 34–48)
HDLC SERPL-MCNC: 62 MG/DL
HEMOGLOBIN: 12.7 G/DL (ref 11.5–15.5)
LDL CHOLESTEROL: 102 MG/DL
MCH RBC QN AUTO: 27.3 PG (ref 26–35)
MCHC RBC AUTO-ENTMCNC: 31.4 G/DL (ref 32–34.5)
MCV RBC AUTO: 87.1 FL (ref 80–99.9)
PDW BLD-RTO: 12.1 % (ref 11.5–15)
PLATELET # BLD: 243 K/UL (ref 130–450)
PMV BLD AUTO: 11.2 FL (ref 7–12)
POTASSIUM SERPL-SCNC: 3.8 MMOL/L (ref 3.5–5)
RBC # BLD: 4.65 M/UL (ref 3.5–5.5)
SODIUM BLD-SCNC: 137 MMOL/L (ref 132–146)
TOTAL PROTEIN: 7 G/DL (ref 6.4–8.3)
TRIGL SERPL-MCNC: 78 MG/DL
TSH SERPL DL<=0.05 MIU/L-ACNC: 1.83 UIU/ML (ref 0.27–4.2)
VLDLC SERPL CALC-MCNC: 16 MG/DL
WBC # BLD: 4.1 K/UL (ref 4.5–11.5)

## 2024-12-05 ENCOUNTER — OFFICE VISIT (OUTPATIENT)
Dept: FAMILY MEDICINE CLINIC | Age: 74
End: 2024-12-05

## 2024-12-05 VITALS
HEART RATE: 83 BPM | HEIGHT: 67 IN | WEIGHT: 141.9 LBS | OXYGEN SATURATION: 98 % | SYSTOLIC BLOOD PRESSURE: 130 MMHG | BODY MASS INDEX: 22.27 KG/M2 | DIASTOLIC BLOOD PRESSURE: 78 MMHG | TEMPERATURE: 97.6 F

## 2024-12-05 DIAGNOSIS — Z12.31 ENCOUNTER FOR SCREENING MAMMOGRAM FOR BREAST CANCER: ICD-10-CM

## 2024-12-05 DIAGNOSIS — I10 PRIMARY HYPERTENSION: ICD-10-CM

## 2024-12-05 DIAGNOSIS — E11.9 TYPE 2 DIABETES MELLITUS WITHOUT COMPLICATION, WITHOUT LONG-TERM CURRENT USE OF INSULIN (HCC): Primary | ICD-10-CM

## 2024-12-05 DIAGNOSIS — E78.00 PURE HYPERCHOLESTEROLEMIA: ICD-10-CM

## 2024-12-05 RX ORDER — ORAL SEMAGLUTIDE 7 MG/1
1 TABLET ORAL DAILY
Qty: 30 TABLET | Refills: 3 | Status: SHIPPED | OUTPATIENT
Start: 2024-12-05

## 2024-12-05 NOTE — PROGRESS NOTES
Tammy José (:  1950) is a 74 y.o. female, Established patient, here for evaluation of the following chief complaint(s):  Diabetes (Patient had to stop jardiance because she got an infection she wanted to discuss other possible options. ) and Discuss Labs (Patient had labs drawn yesterday. )         Assessment & Plan  1. Diabetes Mellitus.  Her blood pressure is well-controlled at 130/78. Liver, kidney, thyroid, and blood counts are all within normal limits. However, her fasting glucose level is elevated at 157 and her A1c is 8.9. She has stopped taking Jardiance due to recurrent infections. She is currently taking Metformin 500 mg, 2 tablets in the morning and 2 tablets in the evening. A prescription for Rybelsus 3 mg once daily for a month has been provided, along with samples. If tolerated, the dose will be increased to 7 mg. She is advised to increase her water intake to 48 to 64 ounces a day. If Rybelsus does not work, other medications will be considered.    2. Health Maintenance.  She will receive her influenza and pneumonia vaccines today. A prescription for a mammogram has been provided, and she can schedule it after the first of the year or with the mammo van that visits the parking lot monthly.    Follow-up  Return in 3 months for follow up.    Results  Laboratory Studies  Cholesterol 180, good cholesterol 62, bad cholesterol 102, triglycerides 78. Fasting sugar 157, A1c 8.9.  Tammy was seen today for diabetes and discuss labs.    Diagnoses and all orders for this visit:    Type 2 diabetes mellitus without complication, without long-term current use of insulin ([facility])  -     Semaglutide (RYBELSUS) 7 MG TABS; Take 1 tablet by mouth daily    Primary hypertension    Pure hypercholesterolemia    Encounter for screening mammogram for breast cancer  -     [facility] NELLA DIGITAL SCREEN BILATERAL [[id number]]; Future    Other orders  -     Pneumococcal, PCV20, PREVNAR 20, (age 6w+), IM, PF  -

## 2025-01-22 ENCOUNTER — TELEPHONE (OUTPATIENT)
Dept: FAMILY MEDICINE CLINIC | Age: 75
End: 2025-01-22

## 2025-01-22 NOTE — TELEPHONE ENCOUNTER
Pt went to fill RX Rybelsus and it was going to cost her $30.00. Pt called wondering if she could get something else in replace that would be cheaper? Pt took her last pill today.

## 2025-02-10 RX ORDER — MECLIZINE HYDROCHLORIDE 25 MG/1
TABLET ORAL
Qty: 90 TABLET | Refills: 1 | Status: SHIPPED | OUTPATIENT
Start: 2025-02-10

## 2025-04-16 DIAGNOSIS — E11.9 TYPE 2 DIABETES MELLITUS WITHOUT COMPLICATION, WITHOUT LONG-TERM CURRENT USE OF INSULIN: ICD-10-CM

## 2025-04-16 RX ORDER — ORAL SEMAGLUTIDE 7 MG/1
1 TABLET ORAL DAILY
Qty: 30 TABLET | Refills: 4 | Status: SHIPPED | OUTPATIENT
Start: 2025-04-16

## 2025-04-22 DIAGNOSIS — E78.00 PURE HYPERCHOLESTEROLEMIA: ICD-10-CM

## 2025-04-22 DIAGNOSIS — I10 PRIMARY HYPERTENSION: Primary | ICD-10-CM

## 2025-04-22 DIAGNOSIS — E11.9 TYPE 2 DIABETES MELLITUS WITHOUT COMPLICATION, WITHOUT LONG-TERM CURRENT USE OF INSULIN (HCC): ICD-10-CM

## 2025-04-22 RX ORDER — ORAL SEMAGLUTIDE 7 MG/1
1 TABLET ORAL DAILY
Qty: 30 TABLET | Refills: 4 | Status: SHIPPED | OUTPATIENT
Start: 2025-04-22 | End: 2025-04-24 | Stop reason: SDUPTHER

## 2025-04-24 ENCOUNTER — TELEPHONE (OUTPATIENT)
Dept: FAMILY MEDICINE CLINIC | Age: 75
End: 2025-04-24

## 2025-04-24 DIAGNOSIS — E11.9 TYPE 2 DIABETES MELLITUS WITHOUT COMPLICATION, WITHOUT LONG-TERM CURRENT USE OF INSULIN (HCC): ICD-10-CM

## 2025-04-24 RX ORDER — ORAL SEMAGLUTIDE 7 MG/1
1 TABLET ORAL DAILY
Qty: 30 TABLET | Refills: 4 | Status: SHIPPED | OUTPATIENT
Start: 2025-04-24

## 2025-05-15 DIAGNOSIS — E78.00 PURE HYPERCHOLESTEROLEMIA: ICD-10-CM

## 2025-05-15 DIAGNOSIS — I10 PRIMARY HYPERTENSION: ICD-10-CM

## 2025-05-15 DIAGNOSIS — E11.9 TYPE 2 DIABETES MELLITUS WITHOUT COMPLICATION, WITHOUT LONG-TERM CURRENT USE OF INSULIN (HCC): ICD-10-CM

## 2025-05-15 LAB
ALBUMIN: 4.2 G/DL (ref 3.5–5.2)
ALP BLD-CCNC: 59 U/L (ref 35–104)
ALT SERPL-CCNC: 17 U/L (ref 0–35)
ANION GAP SERPL CALCULATED.3IONS-SCNC: 12 MMOL/L (ref 7–16)
AST SERPL-CCNC: 28 U/L (ref 0–35)
BILIRUB SERPL-MCNC: 0.7 MG/DL (ref 0–1.2)
BUN BLDV-MCNC: 10 MG/DL (ref 8–23)
CALCIUM SERPL-MCNC: 9.1 MG/DL (ref 8.8–10.2)
CHLORIDE BLD-SCNC: 101 MMOL/L (ref 98–107)
CHOLESTEROL, TOTAL: 137 MG/DL
CO2: 26 MMOL/L (ref 22–29)
CREAT SERPL-MCNC: 0.8 MG/DL (ref 0.5–1)
GFR, ESTIMATED: 84 ML/MIN/1.73M2
GLUCOSE BLD-MCNC: 112 MG/DL (ref 74–99)
HBA1C MFR BLD: 8.2 % (ref 4–5.6)
HCT VFR BLD CALC: 38.3 % (ref 34–48)
HDLC SERPL-MCNC: 59 MG/DL
HEMOGLOBIN: 12.2 G/DL (ref 11.5–15.5)
LDL CHOLESTEROL: 69 MG/DL
MCH RBC QN AUTO: 28 PG (ref 26–35)
MCHC RBC AUTO-ENTMCNC: 31.9 G/DL (ref 32–34.5)
MCV RBC AUTO: 87.8 FL (ref 80–99.9)
PDW BLD-RTO: 12.2 % (ref 11.5–15)
PLATELET # BLD: 228 K/UL (ref 130–450)
PMV BLD AUTO: 11 FL (ref 7–12)
POTASSIUM SERPL-SCNC: 3.7 MMOL/L (ref 3.5–5.1)
RBC # BLD: 4.36 M/UL (ref 3.5–5.5)
SODIUM BLD-SCNC: 140 MMOL/L (ref 136–145)
TOTAL PROTEIN: 6.6 G/DL (ref 6.4–8.3)
TRIGL SERPL-MCNC: 43 MG/DL
TSH SERPL DL<=0.05 MIU/L-ACNC: 1 UIU/ML (ref 0.27–4.2)
VLDLC SERPL CALC-MCNC: 9 MG/DL
WBC # BLD: 3.8 K/UL (ref 4.5–11.5)

## 2025-05-16 ENCOUNTER — OFFICE VISIT (OUTPATIENT)
Dept: FAMILY MEDICINE CLINIC | Age: 75
End: 2025-05-16
Payer: MEDICARE

## 2025-05-16 VITALS
TEMPERATURE: 98.4 F | HEIGHT: 67 IN | DIASTOLIC BLOOD PRESSURE: 78 MMHG | BODY MASS INDEX: 21.72 KG/M2 | HEART RATE: 81 BPM | WEIGHT: 138.4 LBS | OXYGEN SATURATION: 97 % | SYSTOLIC BLOOD PRESSURE: 128 MMHG

## 2025-05-16 DIAGNOSIS — I10 PRIMARY HYPERTENSION: ICD-10-CM

## 2025-05-16 DIAGNOSIS — E11.9 TYPE 2 DIABETES MELLITUS WITHOUT COMPLICATION, WITHOUT LONG-TERM CURRENT USE OF INSULIN (HCC): Primary | ICD-10-CM

## 2025-05-16 DIAGNOSIS — E78.00 PURE HYPERCHOLESTEROLEMIA: ICD-10-CM

## 2025-05-16 DIAGNOSIS — L81.6 SKIN HYPOPIGMENTATION: ICD-10-CM

## 2025-05-16 PROCEDURE — 3078F DIAST BP <80 MM HG: CPT | Performed by: FAMILY MEDICINE

## 2025-05-16 PROCEDURE — 2022F DILAT RTA XM EVC RTNOPTHY: CPT | Performed by: FAMILY MEDICINE

## 2025-05-16 PROCEDURE — 3017F COLORECTAL CA SCREEN DOC REV: CPT | Performed by: FAMILY MEDICINE

## 2025-05-16 PROCEDURE — G8427 DOCREV CUR MEDS BY ELIG CLIN: HCPCS | Performed by: FAMILY MEDICINE

## 2025-05-16 PROCEDURE — 99214 OFFICE O/P EST MOD 30 MIN: CPT | Performed by: FAMILY MEDICINE

## 2025-05-16 PROCEDURE — 3074F SYST BP LT 130 MM HG: CPT | Performed by: FAMILY MEDICINE

## 2025-05-16 PROCEDURE — G8420 CALC BMI NORM PARAMETERS: HCPCS | Performed by: FAMILY MEDICINE

## 2025-05-16 PROCEDURE — 3052F HG A1C>EQUAL 8.0%<EQUAL 9.0%: CPT | Performed by: FAMILY MEDICINE

## 2025-05-16 PROCEDURE — 1159F MED LIST DOCD IN RCRD: CPT | Performed by: FAMILY MEDICINE

## 2025-05-16 PROCEDURE — 1123F ACP DISCUSS/DSCN MKR DOCD: CPT | Performed by: FAMILY MEDICINE

## 2025-05-16 PROCEDURE — G8400 PT W/DXA NO RESULTS DOC: HCPCS | Performed by: FAMILY MEDICINE

## 2025-05-16 PROCEDURE — 1036F TOBACCO NON-USER: CPT | Performed by: FAMILY MEDICINE

## 2025-05-16 PROCEDURE — 1090F PRES/ABSN URINE INCON ASSESS: CPT | Performed by: FAMILY MEDICINE

## 2025-05-16 SDOH — ECONOMIC STABILITY: FOOD INSECURITY: WITHIN THE PAST 12 MONTHS, YOU WORRIED THAT YOUR FOOD WOULD RUN OUT BEFORE YOU GOT MONEY TO BUY MORE.: NEVER TRUE

## 2025-05-16 SDOH — ECONOMIC STABILITY: FOOD INSECURITY: WITHIN THE PAST 12 MONTHS, THE FOOD YOU BOUGHT JUST DIDN'T LAST AND YOU DIDN'T HAVE MONEY TO GET MORE.: NEVER TRUE

## 2025-05-16 ASSESSMENT — PATIENT HEALTH QUESTIONNAIRE - PHQ9
SUM OF ALL RESPONSES TO PHQ QUESTIONS 1-9: 0
SUM OF ALL RESPONSES TO PHQ QUESTIONS 1-9: 0
1. LITTLE INTEREST OR PLEASURE IN DOING THINGS: NOT AT ALL
SUM OF ALL RESPONSES TO PHQ QUESTIONS 1-9: 0
2. FEELING DOWN, DEPRESSED OR HOPELESS: NOT AT ALL
SUM OF ALL RESPONSES TO PHQ QUESTIONS 1-9: 0

## 2025-05-16 NOTE — PROGRESS NOTES
frequency, no urinary incontinence, no urinary urgency, no nocturia, no dysuria         Objective   Vitals:    05/16/25 1148   BP: 128/78   Pulse: 81   Temp: 98.4 °F (36.9 °C)   SpO2: 97%   Weight: 62.8 kg (138 lb 6.4 oz)   Height: 1.702 m (5' 7\")       General:  Patient alert and oriented x 3, NAD, pleasant  HEENT:  Atraumatic, normocephalic, PERRLA, EOMI, clear conjunctiva, TMs clear, nose-clear, throat - no erythema  Neck:  Supple, no goiter, no carotid bruits, no lymphadenopathy  Lungs:  CTA B  Heart:  RRR, no murmurs, gallops or rubs  Abdomen:  Soft/nt/nd, + bowel sounds  Extremities:  No clubbing, cyanosis or edema  Skin: unremarkable             Educational materials and/or home exercises printed for patient's review and were included in patient instructions on his/her After Visit Summary and given to patient at the end of visit.      Counseled regarding above diagnosis, including possible risks and complications,  especially if left uncontrolled.    Counseled regarding the possible side effects, risks, benefits and alternatives to treatment; patient and/or guardian verbalizes understanding, agrees, feels comfortable with and wishes to proceed with above treatment plan.    Advised patient to call with any new medication issues, and read all Rx info from pharmacy to assure aware of all possible risks and side effects of medication before taking.    Reviewed age and gender appropriate health screening exams and vaccinations.  Advised patient regarding importance of keeping up with recommended health maintenance and to schedule as soon as possible if overdue, as this is important in assessing for undiagnosed pathology, especially cancer, as well as protecting against potentially harmful/life threatening disease.        Patient and/or guardian verbalizes understanding and agrees with above counseling, assessment and plan.    All questions answered.      I have personally reviewed and updated the chief complaint,

## 2025-05-19 RX ORDER — CYCLOBENZAPRINE HCL 5 MG
TABLET ORAL
Qty: 30 TABLET | Refills: 6 | Status: SHIPPED | OUTPATIENT
Start: 2025-05-19

## 2025-05-20 ENCOUNTER — TELEPHONE (OUTPATIENT)
Dept: FAMILY MEDICINE CLINIC | Age: 75
End: 2025-05-20

## 2025-05-20 NOTE — TELEPHONE ENCOUNTER
Patient called to ask if she could get some pain killers as the flexeril isn't helping the back pain.

## 2025-05-21 RX ORDER — NAPROXEN 500 MG/1
500 TABLET ORAL 2 TIMES DAILY WITH MEALS
Qty: 20 TABLET | Refills: 0 | Status: SHIPPED | OUTPATIENT
Start: 2025-05-21 | End: 2025-05-31

## 2025-06-25 ENCOUNTER — TELEPHONE (OUTPATIENT)
Dept: FAMILY MEDICINE CLINIC | Age: 75
End: 2025-06-25

## 2025-06-25 NOTE — TELEPHONE ENCOUNTER
The back of her head feels like it's swollen it's been going on for a couple weeks-it's very itchy and that's what's causing the swelling of her head-she just wants some kind of relief she's tried all everything to stop the itching-is there anywhere you could squeeze her in?

## 2025-06-27 ENCOUNTER — OFFICE VISIT (OUTPATIENT)
Dept: FAMILY MEDICINE CLINIC | Age: 75
End: 2025-06-27
Payer: MEDICARE

## 2025-06-27 VITALS
TEMPERATURE: 98.4 F | WEIGHT: 129 LBS | SYSTOLIC BLOOD PRESSURE: 128 MMHG | BODY MASS INDEX: 20.25 KG/M2 | RESPIRATION RATE: 14 BRPM | HEIGHT: 67 IN | DIASTOLIC BLOOD PRESSURE: 74 MMHG | OXYGEN SATURATION: 97 % | HEART RATE: 68 BPM

## 2025-06-27 DIAGNOSIS — S00.01XA ABRASION OF SCALP, INITIAL ENCOUNTER: Primary | ICD-10-CM

## 2025-06-27 PROCEDURE — 1159F MED LIST DOCD IN RCRD: CPT | Performed by: FAMILY MEDICINE

## 2025-06-27 PROCEDURE — 3074F SYST BP LT 130 MM HG: CPT | Performed by: FAMILY MEDICINE

## 2025-06-27 PROCEDURE — 3017F COLORECTAL CA SCREEN DOC REV: CPT | Performed by: FAMILY MEDICINE

## 2025-06-27 PROCEDURE — 3078F DIAST BP <80 MM HG: CPT | Performed by: FAMILY MEDICINE

## 2025-06-27 PROCEDURE — 1090F PRES/ABSN URINE INCON ASSESS: CPT | Performed by: FAMILY MEDICINE

## 2025-06-27 PROCEDURE — 99213 OFFICE O/P EST LOW 20 MIN: CPT | Performed by: FAMILY MEDICINE

## 2025-06-27 PROCEDURE — 1123F ACP DISCUSS/DSCN MKR DOCD: CPT | Performed by: FAMILY MEDICINE

## 2025-06-27 PROCEDURE — G8400 PT W/DXA NO RESULTS DOC: HCPCS | Performed by: FAMILY MEDICINE

## 2025-06-27 PROCEDURE — G8420 CALC BMI NORM PARAMETERS: HCPCS | Performed by: FAMILY MEDICINE

## 2025-06-27 PROCEDURE — G8427 DOCREV CUR MEDS BY ELIG CLIN: HCPCS | Performed by: FAMILY MEDICINE

## 2025-06-27 PROCEDURE — 1036F TOBACCO NON-USER: CPT | Performed by: FAMILY MEDICINE

## 2025-06-27 NOTE — PROGRESS NOTES
Tammy José (:  1950) is a 74 y.o. female, Established patient, here for evaluation of the following chief complaint(s):  Head Injury (States back of head is burning. Does not know if something her hairdresser did is the cause or not.)         Assessment & Plan  1. Scalp swelling.  - The scalp swelling appears to be a localized reaction to a topical application, possibly related to a quick weave.  - The lymph nodes in the area may have reacted to the irritation.  - Over-the-counter Zyrtec 10 mg daily for one week is recommended to reduce histamine levels and alleviate swelling. If there is no improvement, she should consider removing the quick weave completely.  - If the condition persists or recurs, a short course of steroids may be considered, although this could elevate blood sugar levels.    2. Health maintenance.  - She is advised to contact [facility] to obtain her medical records.  - Recommendations for new gynecologists include Dr. Pineda and Dr. Nadine Schreiber at [facility].  - She is advised to schedule an appointment with one of them for her mammogram and other gynecological needs.  - Follow-up in August for her blood sugar.    Results    Tammy was seen today for head injury.    Diagnoses and all orders for this visit:    Abrasion of scalp, initial encounter      1. Abrasion of scalp, initial encounter    No follow-ups on file.         Subjective   History of Present Illness  The patient presents for evaluation of scalp swelling.    She reports a recent incident where her hairdresser applied a quick weave, which led to the development of scabs on her scalp. The onset of swelling was approximately a week ago, accompanied by a burning sensation. She has been under the care of this hairdresser for over a year without any prior issues. Despite applying Neosporin as recommended by her hairdresser, she experienced irritation and swelling after scratching the area. She also attempted

## 2025-07-11 ENCOUNTER — TELEPHONE (OUTPATIENT)
Dept: FAMILY MEDICINE CLINIC | Age: 75
End: 2025-07-11

## 2025-07-11 RX ORDER — METHYLPREDNISOLONE 4 MG/1
TABLET ORAL
Qty: 1 KIT | Refills: 0 | Status: SHIPPED | OUTPATIENT
Start: 2025-07-11 | End: 2025-07-17

## 2025-07-11 NOTE — TELEPHONE ENCOUNTER
Patient called to let doctor know her head still hurts/sore/burning - she would like the medication mentioned at her last visit called in because her symptoms have not improved.

## 2025-07-25 ENCOUNTER — TELEPHONE (OUTPATIENT)
Dept: FAMILY MEDICINE CLINIC | Age: 75
End: 2025-07-25

## 2025-07-25 NOTE — TELEPHONE ENCOUNTER
I suggest her to go back to dermatology. I am not sure what else to do since the steroids did not work.

## 2025-07-25 NOTE — TELEPHONE ENCOUNTER
Patient called in with concerns about her head , states that she is having a burning sensation in the back of her head. Patient has used neosporin , but her scalp is irritated in the back , she thinks it may be due to the tightness of her now hairstyle .Tara is scheduled for 8/20/2025 , I did advised patient she can be seen at  [facility] however patient was  ot comfortable going there.

## 2025-08-20 ENCOUNTER — OFFICE VISIT (OUTPATIENT)
Dept: FAMILY MEDICINE CLINIC | Age: 75
End: 2025-08-20
Payer: MEDICARE

## 2025-08-20 VITALS
HEIGHT: 67 IN | OXYGEN SATURATION: 98 % | HEART RATE: 81 BPM | SYSTOLIC BLOOD PRESSURE: 110 MMHG | BODY MASS INDEX: 19.42 KG/M2 | RESPIRATION RATE: 14 BRPM | WEIGHT: 123.7 LBS | DIASTOLIC BLOOD PRESSURE: 72 MMHG | TEMPERATURE: 97.5 F

## 2025-08-20 DIAGNOSIS — Z78.0 POST-MENOPAUSAL: ICD-10-CM

## 2025-08-20 DIAGNOSIS — E78.00 PURE HYPERCHOLESTEROLEMIA: ICD-10-CM

## 2025-08-20 DIAGNOSIS — I10 PRIMARY HYPERTENSION: Primary | ICD-10-CM

## 2025-08-20 DIAGNOSIS — E11.9 TYPE 2 DIABETES MELLITUS WITHOUT COMPLICATION, WITHOUT LONG-TERM CURRENT USE OF INSULIN (HCC): ICD-10-CM

## 2025-08-20 DIAGNOSIS — Z12.31 ENCOUNTER FOR SCREENING MAMMOGRAM FOR BREAST CANCER: ICD-10-CM

## 2025-08-20 LAB
ALBUMIN: 4.2 G/DL (ref 3.5–5.2)
ALP BLD-CCNC: 55 U/L (ref 35–104)
ALT SERPL-CCNC: 16 U/L (ref 0–35)
ANION GAP SERPL CALCULATED.3IONS-SCNC: 13 MMOL/L (ref 7–16)
AST SERPL-CCNC: 33 U/L (ref 0–35)
BILIRUB SERPL-MCNC: 0.8 MG/DL (ref 0–1.2)
BUN BLDV-MCNC: 17 MG/DL (ref 8–23)
CALCIUM SERPL-MCNC: 9.7 MG/DL (ref 8.8–10.2)
CHLORIDE BLD-SCNC: 104 MMOL/L (ref 98–107)
CHOLESTEROL, TOTAL: 154 MG/DL
CO2: 25 MMOL/L (ref 22–29)
CREAT SERPL-MCNC: 0.8 MG/DL (ref 0.5–1)
GFR, ESTIMATED: 79 ML/MIN/1.73M2
GLUCOSE BLD-MCNC: 107 MG/DL (ref 74–99)
HCT VFR BLD CALC: 37.5 % (ref 34–48)
HDLC SERPL-MCNC: 69 MG/DL
HEMOGLOBIN: 12 G/DL (ref 11.5–15.5)
LDL CHOLESTEROL: 72 MG/DL
MCH RBC QN AUTO: 28.1 PG (ref 26–35)
MCHC RBC AUTO-ENTMCNC: 32 G/DL (ref 32–34.5)
MCV RBC AUTO: 87.8 FL (ref 80–99.9)
PDW BLD-RTO: 12.4 % (ref 11.5–15)
PLATELET # BLD: 244 K/UL (ref 130–450)
PMV BLD AUTO: 11 FL (ref 7–12)
POTASSIUM SERPL-SCNC: 3.6 MMOL/L (ref 3.5–5.1)
RBC # BLD: 4.27 M/UL (ref 3.5–5.5)
SODIUM BLD-SCNC: 141 MMOL/L (ref 136–145)
TOTAL PROTEIN: 6.7 G/DL (ref 6.4–8.3)
TRIGL SERPL-MCNC: 65 MG/DL
VLDLC SERPL CALC-MCNC: 13 MG/DL
WBC # BLD: 4.1 K/UL (ref 4.5–11.5)

## 2025-08-20 PROCEDURE — G8420 CALC BMI NORM PARAMETERS: HCPCS | Performed by: FAMILY MEDICINE

## 2025-08-20 PROCEDURE — 2022F DILAT RTA XM EVC RTNOPTHY: CPT | Performed by: FAMILY MEDICINE

## 2025-08-20 PROCEDURE — G8427 DOCREV CUR MEDS BY ELIG CLIN: HCPCS | Performed by: FAMILY MEDICINE

## 2025-08-20 PROCEDURE — 1090F PRES/ABSN URINE INCON ASSESS: CPT | Performed by: FAMILY MEDICINE

## 2025-08-20 PROCEDURE — 3078F DIAST BP <80 MM HG: CPT | Performed by: FAMILY MEDICINE

## 2025-08-20 PROCEDURE — 36415 COLL VENOUS BLD VENIPUNCTURE: CPT | Performed by: FAMILY MEDICINE

## 2025-08-20 PROCEDURE — 1159F MED LIST DOCD IN RCRD: CPT | Performed by: FAMILY MEDICINE

## 2025-08-20 PROCEDURE — G8400 PT W/DXA NO RESULTS DOC: HCPCS | Performed by: FAMILY MEDICINE

## 2025-08-20 PROCEDURE — 3017F COLORECTAL CA SCREEN DOC REV: CPT | Performed by: FAMILY MEDICINE

## 2025-08-20 PROCEDURE — 1036F TOBACCO NON-USER: CPT | Performed by: FAMILY MEDICINE

## 2025-08-20 PROCEDURE — 99214 OFFICE O/P EST MOD 30 MIN: CPT | Performed by: FAMILY MEDICINE

## 2025-08-20 PROCEDURE — 3052F HG A1C>EQUAL 8.0%<EQUAL 9.0%: CPT | Performed by: FAMILY MEDICINE

## 2025-08-20 PROCEDURE — 1123F ACP DISCUSS/DSCN MKR DOCD: CPT | Performed by: FAMILY MEDICINE

## 2025-08-20 PROCEDURE — 3074F SYST BP LT 130 MM HG: CPT | Performed by: FAMILY MEDICINE

## 2025-08-20 RX ORDER — ORAL SEMAGLUTIDE 7 MG/1
1 TABLET ORAL DAILY
Qty: 30 TABLET | Refills: 4 | Status: CANCELLED | OUTPATIENT
Start: 2025-08-20

## 2025-08-21 LAB — HBA1C MFR BLD: 7.6 % (ref 4–5.6)
